# Patient Record
Sex: FEMALE | Race: BLACK OR AFRICAN AMERICAN | Employment: OTHER | URBAN - METROPOLITAN AREA
[De-identification: names, ages, dates, MRNs, and addresses within clinical notes are randomized per-mention and may not be internally consistent; named-entity substitution may affect disease eponyms.]

---

## 2023-08-14 ENCOUNTER — ANCILLARY PROCEDURE (OUTPATIENT)
Dept: EMERGENCY DEPT | Age: 78
End: 2023-08-14
Attending: EMERGENCY MEDICINE
Payer: MEDICARE

## 2023-08-14 ENCOUNTER — HOSPITAL ENCOUNTER (INPATIENT)
Age: 78
LOS: 3 days | Discharge: NURSING, REHAB OR LONG-TERM CARE | End: 2023-08-17
Attending: EMERGENCY MEDICINE | Admitting: INTERNAL MEDICINE
Payer: MEDICARE

## 2023-08-14 ENCOUNTER — APPOINTMENT (OUTPATIENT)
Dept: GENERAL RADIOLOGY | Age: 78
End: 2023-08-14
Payer: MEDICARE

## 2023-08-14 DIAGNOSIS — N18.6 ESRD (END STAGE RENAL DISEASE) (HCC): Primary | ICD-10-CM

## 2023-08-14 LAB
ANION GAP SERPL CALCULATED.3IONS-SCNC: 13 MMOL/L (ref 3–16)
ANION GAP SERPL CALCULATED.3IONS-SCNC: 15 MMOL/L (ref 3–16)
BASE EXCESS BLDV CALC-SCNC: 1.3 MMOL/L (ref -2–3)
BASOPHILS # BLD: 0.1 K/UL (ref 0–0.2)
BASOPHILS # BLD: 0.1 K/UL (ref 0–0.2)
BASOPHILS NFR BLD: 0.7 %
BASOPHILS NFR BLD: 0.8 %
BUN SERPL-MCNC: 36 MG/DL (ref 7–20)
BUN SERPL-MCNC: 39 MG/DL (ref 7–20)
CALCIUM SERPL-MCNC: 9.3 MG/DL (ref 8.3–10.6)
CALCIUM SERPL-MCNC: 9.5 MG/DL (ref 8.3–10.6)
CHLORIDE SERPL-SCNC: 96 MMOL/L (ref 99–110)
CHLORIDE SERPL-SCNC: 96 MMOL/L (ref 99–110)
CO2 BLDV-SCNC: 30 MMOL/L
CO2 SERPL-SCNC: 24 MMOL/L (ref 21–32)
CO2 SERPL-SCNC: 26 MMOL/L (ref 21–32)
COHGB MFR BLDV: 1.6 % (ref 0–1.5)
CREAT SERPL-MCNC: 5 MG/DL (ref 0.6–1.2)
CREAT SERPL-MCNC: 5.2 MG/DL (ref 0.6–1.2)
DEPRECATED RDW RBC AUTO: 15.7 % (ref 12.4–15.4)
DEPRECATED RDW RBC AUTO: 16.3 % (ref 12.4–15.4)
DO-HGB MFR BLDV: 15.4 %
EKG ATRIAL RATE: 76 BPM
EKG ATRIAL RATE: 77 BPM
EKG DIAGNOSIS: NORMAL
EKG DIAGNOSIS: NORMAL
EKG P AXIS: 26 DEGREES
EKG P AXIS: 39 DEGREES
EKG P-R INTERVAL: 188 MS
EKG P-R INTERVAL: 202 MS
EKG Q-T INTERVAL: 436 MS
EKG Q-T INTERVAL: 440 MS
EKG QRS DURATION: 132 MS
EKG QRS DURATION: 136 MS
EKG QTC CALCULATION (BAZETT): 493 MS
EKG QTC CALCULATION (BAZETT): 495 MS
EKG R AXIS: -15 DEGREES
EKG R AXIS: -31 DEGREES
EKG T AXIS: 43 DEGREES
EKG T AXIS: 81 DEGREES
EKG VENTRICULAR RATE: 76 BPM
EKG VENTRICULAR RATE: 77 BPM
EOSINOPHIL # BLD: 0.1 K/UL (ref 0–0.6)
EOSINOPHIL # BLD: 0.3 K/UL (ref 0–0.6)
EOSINOPHIL NFR BLD: 0.8 %
EOSINOPHIL NFR BLD: 3.3 %
FLUAV RNA UPPER RESP QL NAA+PROBE: NEGATIVE
FLUBV AG NPH QL: NEGATIVE
GFR SERPLBLD CREATININE-BSD FMLA CKD-EPI: 8 ML/MIN/{1.73_M2}
GFR SERPLBLD CREATININE-BSD FMLA CKD-EPI: 8 ML/MIN/{1.73_M2}
GLUCOSE BLD-MCNC: 140 MG/DL (ref 70–99)
GLUCOSE BLD-MCNC: 70 MG/DL (ref 70–99)
GLUCOSE BLD-MCNC: 78 MG/DL (ref 70–99)
GLUCOSE BLD-MCNC: 92 MG/DL (ref 70–99)
GLUCOSE SERPL-MCNC: 152 MG/DL (ref 70–99)
GLUCOSE SERPL-MCNC: 169 MG/DL (ref 70–99)
HCO3 BLDV-SCNC: 28 MMOL/L (ref 24–28)
HCT VFR BLD AUTO: 31.1 % (ref 36–48)
HCT VFR BLD AUTO: 32.7 % (ref 36–48)
HGB BLD-MCNC: 10.8 G/DL (ref 12–16)
HGB BLD-MCNC: 10.8 G/DL (ref 12–16)
INR PPP: 2.4 (ref 0.84–1.16)
LYMPHOCYTES # BLD: 0.6 K/UL (ref 1–5.1)
LYMPHOCYTES # BLD: 1.1 K/UL (ref 1–5.1)
LYMPHOCYTES NFR BLD: 11.8 %
LYMPHOCYTES NFR BLD: 9 %
MAGNESIUM SERPL-MCNC: 1.9 MG/DL (ref 1.8–2.4)
MCH RBC QN AUTO: 33.4 PG (ref 26–34)
MCH RBC QN AUTO: 34.3 PG (ref 26–34)
MCHC RBC AUTO-ENTMCNC: 33.1 G/DL (ref 31–36)
MCHC RBC AUTO-ENTMCNC: 34.6 G/DL (ref 31–36)
MCV RBC AUTO: 101.1 FL (ref 80–100)
MCV RBC AUTO: 99.3 FL (ref 80–100)
METHGB MFR BLDV: 0.2 % (ref 0–1.5)
MONOCYTES # BLD: 0.5 K/UL (ref 0–1.3)
MONOCYTES # BLD: 0.5 K/UL (ref 0–1.3)
MONOCYTES NFR BLD: 5.3 %
MONOCYTES NFR BLD: 7.1 %
NEUTROPHILS # BLD: 5.5 K/UL (ref 1.7–7.7)
NEUTROPHILS # BLD: 7.1 K/UL (ref 1.7–7.7)
NEUTROPHILS NFR BLD: 78.9 %
NEUTROPHILS NFR BLD: 82.3 %
NT-PROBNP SERPL-MCNC: ABNORMAL PG/ML (ref 0–449)
PCO2 BLDV: 53.3 MMHG (ref 41–51)
PERFORMED ON: ABNORMAL
PERFORMED ON: NORMAL
PH BLDV: 7.33 [PH] (ref 7.35–7.45)
PLATELET # BLD AUTO: 214 K/UL (ref 135–450)
PLATELET # BLD AUTO: 271 K/UL (ref 135–450)
PMV BLD AUTO: 10.5 FL (ref 5–10.5)
PMV BLD AUTO: 9.7 FL (ref 5–10.5)
PO2 BLDV: 51.6 MMHG (ref 25–40)
POTASSIUM SERPL-SCNC: 4.5 MMOL/L (ref 3.5–5.1)
POTASSIUM SERPL-SCNC: 5.4 MMOL/L (ref 3.5–5.1)
PROTHROMBIN TIME: 26 SEC (ref 11.5–14.8)
RBC # BLD AUTO: 3.14 M/UL (ref 4–5.2)
RBC # BLD AUTO: 3.23 M/UL (ref 4–5.2)
SAO2 % BLDV: 84 %
SARS-COV-2 RDRP RESP QL NAA+PROBE: NOT DETECTED
SODIUM SERPL-SCNC: 135 MMOL/L (ref 136–145)
SODIUM SERPL-SCNC: 135 MMOL/L (ref 136–145)
TROPONIN, HIGH SENSITIVITY: 145 NG/L (ref 0–14)
TROPONIN, HIGH SENSITIVITY: 149 NG/L (ref 0–14)
WBC # BLD AUTO: 6.7 K/UL (ref 4–11)
WBC # BLD AUTO: 9 K/UL (ref 4–11)

## 2023-08-14 PROCEDURE — 87804 INFLUENZA ASSAY W/OPTIC: CPT

## 2023-08-14 PROCEDURE — 87635 SARS-COV-2 COVID-19 AMP PRB: CPT

## 2023-08-14 PROCEDURE — 36415 COLL VENOUS BLD VENIPUNCTURE: CPT

## 2023-08-14 PROCEDURE — 2580000003 HC RX 258

## 2023-08-14 PROCEDURE — 94761 N-INVAS EAR/PLS OXIMETRY MLT: CPT

## 2023-08-14 PROCEDURE — 99285 EMERGENCY DEPT VISIT HI MDM: CPT

## 2023-08-14 PROCEDURE — 5A1D70Z PERFORMANCE OF URINARY FILTRATION, INTERMITTENT, LESS THAN 6 HOURS PER DAY: ICD-10-PCS | Performed by: INTERNAL MEDICINE

## 2023-08-14 PROCEDURE — 80048 BASIC METABOLIC PNL TOTAL CA: CPT

## 2023-08-14 PROCEDURE — 90935 HEMODIALYSIS ONE EVALUATION: CPT

## 2023-08-14 PROCEDURE — 2700000000 HC OXYGEN THERAPY PER DAY

## 2023-08-14 PROCEDURE — 83735 ASSAY OF MAGNESIUM: CPT

## 2023-08-14 PROCEDURE — 71046 X-RAY EXAM CHEST 2 VIEWS: CPT

## 2023-08-14 PROCEDURE — 85025 COMPLETE CBC W/AUTO DIFF WBC: CPT

## 2023-08-14 PROCEDURE — 80074 ACUTE HEPATITIS PANEL: CPT

## 2023-08-14 PROCEDURE — 94640 AIRWAY INHALATION TREATMENT: CPT

## 2023-08-14 PROCEDURE — 76604 US EXAM CHEST: CPT

## 2023-08-14 PROCEDURE — 82803 BLOOD GASES ANY COMBINATION: CPT

## 2023-08-14 PROCEDURE — 93005 ELECTROCARDIOGRAM TRACING: CPT

## 2023-08-14 PROCEDURE — 85610 PROTHROMBIN TIME: CPT

## 2023-08-14 PROCEDURE — 84484 ASSAY OF TROPONIN QUANT: CPT

## 2023-08-14 PROCEDURE — 6370000000 HC RX 637 (ALT 250 FOR IP)

## 2023-08-14 PROCEDURE — 1200000000 HC SEMI PRIVATE

## 2023-08-14 PROCEDURE — 83880 ASSAY OF NATRIURETIC PEPTIDE: CPT

## 2023-08-14 RX ORDER — ACETAMINOPHEN 325 MG/1
650 TABLET ORAL EVERY 6 HOURS PRN
Status: DISCONTINUED | OUTPATIENT
Start: 2023-08-14 | End: 2023-08-17 | Stop reason: HOSPADM

## 2023-08-14 RX ORDER — ACETAMINOPHEN 650 MG/1
650 SUPPOSITORY RECTAL EVERY 6 HOURS PRN
Status: DISCONTINUED | OUTPATIENT
Start: 2023-08-14 | End: 2023-08-17 | Stop reason: HOSPADM

## 2023-08-14 RX ORDER — METOPROLOL SUCCINATE 50 MG/1
50 TABLET, EXTENDED RELEASE ORAL DAILY
Status: ON HOLD | COMMUNITY
End: 2023-08-17 | Stop reason: HOSPADM

## 2023-08-14 RX ORDER — SODIUM CHLORIDE 9 MG/ML
INJECTION, SOLUTION INTRAVENOUS PRN
Status: DISCONTINUED | OUTPATIENT
Start: 2023-08-14 | End: 2023-08-17 | Stop reason: HOSPADM

## 2023-08-14 RX ORDER — INSULIN LISPRO 100 [IU]/ML
0-4 INJECTION, SOLUTION INTRAVENOUS; SUBCUTANEOUS NIGHTLY
Status: DISCONTINUED | OUTPATIENT
Start: 2023-08-14 | End: 2023-08-15

## 2023-08-14 RX ORDER — SODIUM CHLORIDE 0.9 % (FLUSH) 0.9 %
5-40 SYRINGE (ML) INJECTION EVERY 12 HOURS SCHEDULED
Status: DISCONTINUED | OUTPATIENT
Start: 2023-08-14 | End: 2023-08-17 | Stop reason: HOSPADM

## 2023-08-14 RX ORDER — DEXTRAN 70, GLYCERIN, HYPROMELLOSE 1; 2; 3 MG/ML; MG/ML; MG/ML
1 SOLUTION/ DROPS OPHTHALMIC 2 TIMES DAILY
Status: DISCONTINUED | OUTPATIENT
Start: 2023-08-14 | End: 2023-08-17 | Stop reason: HOSPADM

## 2023-08-14 RX ORDER — METOPROLOL SUCCINATE 25 MG/1
50 TABLET, EXTENDED RELEASE ORAL
Status: DISCONTINUED | OUTPATIENT
Start: 2023-08-14 | End: 2023-08-16

## 2023-08-14 RX ORDER — AMLODIPINE BESYLATE 10 MG/1
10 TABLET ORAL
COMMUNITY

## 2023-08-14 RX ORDER — PANTOPRAZOLE SODIUM 40 MG/1
40 TABLET, DELAYED RELEASE ORAL 2 TIMES DAILY
COMMUNITY

## 2023-08-14 RX ORDER — ONDANSETRON 4 MG/1
4 TABLET, ORALLY DISINTEGRATING ORAL EVERY 8 HOURS PRN
Status: DISCONTINUED | OUTPATIENT
Start: 2023-08-14 | End: 2023-08-17 | Stop reason: HOSPADM

## 2023-08-14 RX ORDER — MIRTAZAPINE 15 MG/1
7.5 TABLET, FILM COATED ORAL NIGHTLY
Status: DISCONTINUED | OUTPATIENT
Start: 2023-08-14 | End: 2023-08-17 | Stop reason: HOSPADM

## 2023-08-14 RX ORDER — ACETAMINOPHEN 325 MG/1
650 TABLET ORAL EVERY 6 HOURS PRN
COMMUNITY

## 2023-08-14 RX ORDER — GLYCERIN .002; .002; .01 MG/MG; MG/MG; MG/MG
1 SOLUTION/ DROPS OPHTHALMIC 2 TIMES DAILY
COMMUNITY

## 2023-08-14 RX ORDER — SODIUM CHLORIDE 0.9 % (FLUSH) 0.9 %
5-40 SYRINGE (ML) INJECTION PRN
Status: DISCONTINUED | OUTPATIENT
Start: 2023-08-14 | End: 2023-08-17 | Stop reason: HOSPADM

## 2023-08-14 RX ORDER — DEXTROSE MONOHYDRATE 100 MG/ML
INJECTION, SOLUTION INTRAVENOUS CONTINUOUS PRN
Status: DISCONTINUED | OUTPATIENT
Start: 2023-08-14 | End: 2023-08-17 | Stop reason: HOSPADM

## 2023-08-14 RX ORDER — POLYETHYLENE GLYCOL 3350 17 G/17G
17 POWDER, FOR SOLUTION ORAL DAILY
COMMUNITY

## 2023-08-14 RX ORDER — MIRTAZAPINE 15 MG/1
7.5 TABLET, FILM COATED ORAL NIGHTLY
COMMUNITY

## 2023-08-14 RX ORDER — ONDANSETRON 2 MG/ML
4 INJECTION INTRAMUSCULAR; INTRAVENOUS EVERY 6 HOURS PRN
Status: DISCONTINUED | OUTPATIENT
Start: 2023-08-14 | End: 2023-08-17 | Stop reason: HOSPADM

## 2023-08-14 RX ORDER — WARFARIN SODIUM 2.5 MG/1
2.5 TABLET ORAL
Status: COMPLETED | OUTPATIENT
Start: 2023-08-14 | End: 2023-08-14

## 2023-08-14 RX ORDER — ATORVASTATIN CALCIUM 40 MG/1
40 TABLET, FILM COATED ORAL DAILY
Status: DISCONTINUED | OUTPATIENT
Start: 2023-08-14 | End: 2023-08-17 | Stop reason: HOSPADM

## 2023-08-14 RX ORDER — LIDOCAINE 50 MG/G
1 PATCH TOPICAL DAILY
COMMUNITY

## 2023-08-14 RX ORDER — PANTOPRAZOLE SODIUM 40 MG/1
40 TABLET, DELAYED RELEASE ORAL 2 TIMES DAILY
Status: DISCONTINUED | OUTPATIENT
Start: 2023-08-14 | End: 2023-08-17 | Stop reason: HOSPADM

## 2023-08-14 RX ORDER — NICOTINE POLACRILEX 4 MG
15 LOZENGE BUCCAL PRN
COMMUNITY

## 2023-08-14 RX ORDER — INSULIN LISPRO 100 [IU]/ML
0-4 INJECTION, SOLUTION INTRAVENOUS; SUBCUTANEOUS
Status: DISCONTINUED | OUTPATIENT
Start: 2023-08-14 | End: 2023-08-15

## 2023-08-14 RX ORDER — WARFARIN SODIUM 2.5 MG/1
2.5 TABLET ORAL DAILY
COMMUNITY

## 2023-08-14 RX ORDER — ATORVASTATIN CALCIUM 40 MG/1
40 TABLET, FILM COATED ORAL DAILY
COMMUNITY

## 2023-08-14 RX ORDER — IPRATROPIUM BROMIDE AND ALBUTEROL SULFATE 2.5; .5 MG/3ML; MG/3ML
1 SOLUTION RESPIRATORY (INHALATION) ONCE
Status: COMPLETED | OUTPATIENT
Start: 2023-08-14 | End: 2023-08-14

## 2023-08-14 RX ORDER — POLYETHYLENE GLYCOL 3350 17 G/17G
17 POWDER, FOR SOLUTION ORAL DAILY PRN
Status: DISCONTINUED | OUTPATIENT
Start: 2023-08-14 | End: 2023-08-17 | Stop reason: HOSPADM

## 2023-08-14 RX ORDER — AMLODIPINE BESYLATE 10 MG/1
10 TABLET ORAL
Status: DISCONTINUED | OUTPATIENT
Start: 2023-08-14 | End: 2023-08-17 | Stop reason: HOSPADM

## 2023-08-14 RX ADMIN — PANTOPRAZOLE SODIUM 40 MG: 40 TABLET, DELAYED RELEASE ORAL at 21:03

## 2023-08-14 RX ADMIN — AMLODIPINE BESYLATE 10 MG: 10 TABLET ORAL at 06:13

## 2023-08-14 RX ADMIN — PANTOPRAZOLE SODIUM 40 MG: 40 TABLET, DELAYED RELEASE ORAL at 06:13

## 2023-08-14 RX ADMIN — PANCRELIPASE 6000 UNITS: 30000; 6000; 19000 CAPSULE, DELAYED RELEASE PELLETS ORAL at 15:37

## 2023-08-14 RX ADMIN — METOPROLOL SUCCINATE 50 MG: 25 TABLET, EXTENDED RELEASE ORAL at 21:03

## 2023-08-14 RX ADMIN — PANCRELIPASE 6000 UNITS: 30000; 6000; 19000 CAPSULE, DELAYED RELEASE PELLETS ORAL at 21:03

## 2023-08-14 RX ADMIN — SODIUM CHLORIDE, PRESERVATIVE FREE 10 ML: 5 INJECTION INTRAVENOUS at 21:04

## 2023-08-14 RX ADMIN — DEXTRAN 70, GLYCERIN, HYPROMELLOSE 1 DROP: 1; 2; 3 SOLUTION/ DROPS OPHTHALMIC at 15:47

## 2023-08-14 RX ADMIN — IPRATROPIUM BROMIDE AND ALBUTEROL SULFATE 1 DOSE: .5; 3 SOLUTION RESPIRATORY (INHALATION) at 02:23

## 2023-08-14 RX ADMIN — Medication 5000 UNITS: at 15:35

## 2023-08-14 RX ADMIN — SODIUM CHLORIDE, PRESERVATIVE FREE 10 ML: 5 INJECTION INTRAVENOUS at 10:22

## 2023-08-14 RX ADMIN — WARFARIN SODIUM 2.5 MG: 2.5 TABLET ORAL at 18:06

## 2023-08-14 RX ADMIN — METOPROLOL SUCCINATE 50 MG: 25 TABLET, EXTENDED RELEASE ORAL at 06:12

## 2023-08-14 RX ADMIN — MIRTAZAPINE 7.5 MG: 15 TABLET, FILM COATED ORAL at 21:03

## 2023-08-14 RX ADMIN — ATORVASTATIN CALCIUM 40 MG: 40 TABLET, FILM COATED ORAL at 15:35

## 2023-08-14 RX ADMIN — AMLODIPINE BESYLATE 10 MG: 10 TABLET ORAL at 18:06

## 2023-08-14 ASSESSMENT — PAIN SCALES - GENERAL
PAINLEVEL_OUTOF10: 0

## 2023-08-14 ASSESSMENT — ENCOUNTER SYMPTOMS
ABDOMINAL PAIN: 0
COUGH: 0
VOMITING: 0
SHORTNESS OF BREATH: 1
NAUSEA: 0
BACK PAIN: 0

## 2023-08-14 NOTE — DISCHARGE INSTRUCTIONS
Extra Heart Failure sites:     https://[employer]. com/publication/?c=558749   --- this is American Heart Association interactive Healthier Living with Heart Failure guidebook. Please click hyperlink or copy / paste link into search bar. Use your mouse to scroll through the pages. Lots of information about weight monitoring, diet tips, activity, meds, etc    HF [place] tere  -- this is a free smart phone tere available for iPhone and Android download. Use your phone to track sodium / fluid intake, zone tool symptom tracking, weights, medications, etc. Click on this hyperlink  HF [place]   for QR code for easy download. DASH (Dietary Approach to Stop Hypertension) diet --  SeekAlumni.no -- this diet is a flexible eating plan that promotes heart healthy eating style. Click on hyperlink or copy / paste link into search bar. Lots of low sodium recipes and tips.     [employer].ca  -- more free recipes

## 2023-08-14 NOTE — PLAN OF CARE
Problem: Discharge Planning  Goal: Discharge to home or other facility with appropriate resources  8/14/2023 1200 by Michelle Foreman RN  Outcome: Progressing  Flowsheets (Taken 8/14/2023 1200)  Discharge to home or other facility with appropriate resources:   Identify barriers to discharge with patient and caregiver   Arrange for needed discharge resources and transportation as appropriate   Identify discharge learning needs (meds, wound care, etc)   Refer to discharge planning if patient needs post-hospital services based on physician order or complex needs related to functional status, cognitive ability or social support system     Problem: Skin/Tissue Integrity  Goal: Absence of new skin breakdown  Description: 1. Monitor for areas of redness and/or skin breakdown  2. Assess vascular access sites hourly  3. Every 4-6 hours minimum:  Change oxygen saturation probe site  4. Every 4-6 hours:  If on nasal continuous positive airway pressure, respiratory therapy assess nares and determine need for appliance change or resting period.   8/14/2023 0546 by Kavon Brunson RN  Outcome: Progressing  Note: Q2 turn     Problem: Safety - Adult  Goal: Free from fall injury  8/14/2023 1200 by Michelle Foreman RN  Outcome: Progressing  Flowsheets (Taken 8/14/2023 1200)  Free From Fall Injury:   Instruct family/caregiver on patient safety   Based on caregiver fall risk screen, instruct family/caregiver to ask for assistance with transferring infant if caregiver noted to have fall risk factors     Problem: Pain  Goal: Verbalizes/displays adequate comfort level or baseline comfort level  Outcome: Progressing  Flowsheets (Taken 8/14/2023 1200)  Verbalizes/displays adequate comfort level or baseline comfort level:   Encourage patient to monitor pain and request assistance   Assess pain using appropriate pain scale   Administer analgesics based on type and severity of pain and evaluate response   Implement

## 2023-08-14 NOTE — CONSULTS
Nephrology Consult Note          [facility]      [email or web address]         Phone: [phone]      8/14/2023 11:20 AM     Patient: Mariam Galan [id number]  [id number]  Date of Admit: 8/14/2023 LOS: 0 days  Referring physician:    Plan: We will arrange dialysis for today. Resumed her home schedule, MWF. Patient states she makes some urine, will monitor UOP closely. Obtain accurate bed weight if possible. Monitor vitals, labs closely. Renally dose of medications, avoid nephrotoxins. Thank you for allowing us to participate in this patient's care    In case of any question please call us at our 24 hour answering service [phone] or from 7 AM to 5 PM via [employer] or cell number    Mike Cortez MD  [facility]  [facility], [facility] [address]  Fax: [phone]  Office: [phone]     Assessment & Plan     Renal function:  ESRD on HD    Baseline Cr around 5. Will arrange for dialysis per her regular schedule, next session will be today. Electrolytes:  No abnormalities. Lab Results   Component Value Date    CREATININE 5.2 (HH) 08/14/2023    BUN 39 (H) 08/14/2023     (L) 08/14/2023    K 4.5 08/14/2023    CL 96 (L) 08/14/2023    CO2 26 08/14/2023      Lab Results   Component Value Date    CALCIUM 9.5 08/14/2023     Acid/Base status:  Stable. Volume status/BP:  Diminished breath sounds. CXR with evidence of fluid overload, b/l pleural effusions. Hematology:   No results found for: IRON, TIBC, FERRITIN  Lab Results   Component Value Date    WBC 6.7 08/14/2023    HGB 10.8 (L) 08/14/2023    HCT 32.7 (L) 08/14/2023    .1 (H) 08/14/2023     08/14/2023     Reason for Consult     Reason for consult: SOB, vol overload, need for dialysis. Chief complaint:   Chief Complaint   Patient presents with    Shortness of Breath     Pt arrives via EMS for c/o SOB and CP. PT is a dialysis patient reports no missed sessions.  Does not normally

## 2023-08-14 NOTE — PLAN OF CARE
[facility] Hospitalist brief note  Consult received. Case reviewed with ER physician  78-year-old female on hemodialysis comes in with fluid overload. Full note to follow.     Tiffanie Zepeda MD    Thanks  Frantz Duff MD

## 2023-08-14 NOTE — H&P
Internal Medicine  PGY 1  History & Physical      CC :SOB    History Obtained From:  patient    HISTORY OF PRESENT ILLNESS:    Abel Birch is a 78 y.o. female with a PMHx of ESRD w/ MWF HD, CHF, T2DM on insulin s/p BLE amputation, PE, Stroke, HTN, CAD, and  who presented from nursing home with SOB. Patient is a poor historian. It appears that patient is not on home oxygen but required up to 5L in the ED. Patient states that she hasn't missed any sessions and makes urine. On arrival to the ED, patient was hypertensive, hypoxic and had increased work of breathing. Required up to 5L O2 to saturate well. [place] [address] and Troponin of 145, repeat of 149. Bedside ultrasound showed B-lines and chest x-ray showed bibasilar hazy attenuation favoring congestive heart failure with bilateral pleural effusions. Admitted for urgent dialysis. Denies CP, abdominal pain, headache, cough, N/V but does endorse right knee pain. States that she has chronic constipation and diarrhea. Past Medical History:    No past medical history on file. Past Surgical History:    No past surgical history on file. Medications Priorto Admission:    Not in a hospital admission. Allergies:  Patient has no known allergies. Social History:   TOBACCO:   has no history on file for tobacco use. ETOH:   has no history on file for alcohol use. DRUGS :   Patient currently lives in a nursing home [facility]    Family History:   No family history on file. Review of Systems    ROS: A 10 point review of systems was conducted, significant findings as noted in HPI. Physical Exam  Constitutional:       General: She is not in acute distress. Appearance: She is not toxic-appearing. HENT:      Head: Normocephalic and atraumatic. Neck:      Comments: JVD present   Cardiovascular:      Rate and Rhythm: Normal rate and regular rhythm. Pulses: Normal pulses. Heart sounds: Normal heart sounds.    Pulmonary:

## 2023-08-14 NOTE — ED NOTES
ED TO INPATIENT SBAR HANDOFF    Patient Name: Charles Aguirre   :  1945  78 y.o. MRN:  [id number]  Preferred Name  [address] [place] [address]  ED Room #:  2TR/2TRA-A2  Family/Caregiver Present yes   Restraints no   Sitter no   Sepsis Risk Score Sepsis Risk Score: 1.1    Situation  Code Status: No Order No additional code details. Allergies: Patient has no known allergies. Weight: Patient Vitals for the past 96 hrs (Last 3 readings):   Weight   23 0136 113 lb 11.2 oz (51.6 kg)     Arrived from: nursing home  Chief Complaint:   Chief Complaint   Patient presents with    Shortness of Breath     Pt arrives via EMS for c/o SOB and CP. PT is a dialysis patient reports no missed sessions. Does not normally wear oxygen, requiring O2 3LPM per NC to maintain sats >90% at this time. Hospital Problem/Diagnosis:  Principal Problem:    ESRD (end stage renal disease) ([address])  Resolved Problems:    * No resolved hospital problems. *    Imaging:   POC US CHEST INCLUDING MEDIASTINUM   Final Result      XR CHEST (2 VW)   Final Result      Cardiac enlargement, perihilar and bibasilar hazy attenuation visualized favoring congestive heart failure. Bilateral pleural effusions are also noted.       Electronically signed by MD Briana Jose        Abnormal labs:   Abnormal Labs Reviewed   CBC WITH AUTO DIFFERENTIAL - Abnormal; Notable for the following components:       Result Value    RBC 3.14 (*)     Hemoglobin 10.8 (*)     Hematocrit 31.1 (*)     MCH 34.3 (*)     RDW 16.3 (*)     All other components within normal limits   BASIC METABOLIC PANEL W/ REFLEX TO MG FOR LOW K - Abnormal; Notable for the following components:    Sodium 135 (*)     Potassium reflex Magnesium 5.4 (*)     Chloride 96 (*)     Glucose 169 (*)     BUN 36 (*)     Creatinine 5.0 (*)     Est, Glom Filt Rate 8 (*)     All other components within normal limits   TROPONIN - Abnormal; Notable for the following components:    Troponin, High Sensitivity 145 (*)     All other

## 2023-08-14 NOTE — PROGRESS NOTES
4 Eyes Skin Assessment     NAME:  Mazin Harry  YOB: 1945  MEDICAL RECORD NUMBER:  [id number]    The patient is being assessed for  Admission    I agree that at least one RN has performed a thorough Head to Toe Skin Assessment on the patient. ALL assessment sites listed below have been assessed. Areas assessed by both nurses:    Head, Face, Ears, Shoulders, Back, Chest, Arms, Elbows, Hands, Sacrum. Buttock, Coccyx, Ischium, Legs. Feet and Heels, and Under Medical Devices           Patient has a stage two on top of coccyx and left buttocks. Does the Patient have a Wound? Yes wound(s) were present on assessment.  LDA wound assessment was Initiated and completed by RN       Ishmael Prevention initiated by RN: Yes  Wound Care Orders initiated by RN: Yes    Pressure Injury (Stage 3,4, Unstageable, DTI, NWPT, and Complex wounds) if present, place Wound referral order by RN under : No    New Ostomies, if present place, Ostomy referral order under : No     Nurse 1 eSignature: Electronically signed by Vera Blevins RN on 8/14/23 at 5:47 AM EDT    **SHARE this note so that the co-signing nurse can place an eSignature**    Nurse 2 eSignature: Electronically signed by Peyton Wilkins RN on 2/37/21 at 5:30 AM EDT

## 2023-08-14 NOTE — PLAN OF CARE
Problem: Discharge Planning  Goal: Discharge to home or other facility with appropriate resources  Outcome: Progressing  Flowsheets (Taken 8/14/2023 0546)  Discharge to home or other facility with appropriate resources:   Identify barriers to discharge with patient and caregiver   Arrange for needed discharge resources and transportation as appropriate   Identify discharge learning needs (meds, wound care, etc)     Problem: Skin/Tissue Integrity  Goal: Absence of new skin breakdown  Description: 1. Monitor for areas of redness and/or skin breakdown  2. Assess vascular access sites hourly  3. Every 4-6 hours minimum:  Change oxygen saturation probe site  4. Every 4-6 hours:  If on nasal continuous positive airway pressure, respiratory therapy assess nares and determine need for appliance change or resting period. Outcome: Progressing  Note: Q2 turn     Problem: Safety - Adult  Goal: Free from fall injury  Outcome: Progressing  Flowsheets (Taken 8/14/2023 0546)  Free From Fall Injury: Instruct family/caregiver on patient safety  Note: Pt is a Fall Risk. See Osorio Jimenes Fall Risk Score. Pt bed in low position and side rails up. Call light and belongings in reach. Pt encouraged to call for assistance. Will continue with hourly rounds for PO intake, pain needs, toileting, and repositioning as needed.

## 2023-08-14 NOTE — CONSULTS
Clinical Pharmacy Consult Note  Medication History     Admit Date: 8/14/2023    Pharmacy consulted to verify home medication list by Dr. Arben Diaz.    List of of current medications patient is taking is complete. Home Medication list in EPIC updated to reflect changes noted below. Source of information: [address] made to medication list:   Medications removed: (include reason, ex: therapy completed, patient no longer taking, etc.)  None   Medications added:   Glucose gel  Glucose tablet  Dimethicone cream  Medication doses adjusted:   Vitamin d3 5,000 units daily changed to 50,000 units daily  Other notes:   50,000 units of vitamin d3 is a very high dose, it's usually given once weekly or monthly  I can see on the pt's nursing facility [facility] that's she's getting this daily     Current Outpatient Medications   Medication Instructions    acetaminophen (TYLENOL) 650 mg, Oral, EVERY 6 HOURS PRN    amLODIPine (NORVASC) 10 mg, Oral, DAILY WITH DINNER    atorvastatin (LIPITOR) 40 mg, Oral, DAILY    Cholecalciferol 50,000 Units, Oral, DAILY    DIMETHICONE, TOPICAL, 2 % CREA Apply externally, Apply to buttocks/ ailin area each incontinence episode every shift     glucose (GLUTOSE) 15 g, Oral, PRN    glycerin-hypromellose- (ARTIFICIAL TEARS) 0.2-0.2-1 % SOLN opthalmic solution 1 drop, Both Eyes, 2 TIMES DAILY    lidocaine (LIDODERM) 5 % 1 patch, TransDERmal, DAILY, 12 hours on, 12 hours off.  Apply 1 patch to left shoulder at dinner and remove in the morning     metoprolol succinate (TOPROL XL) 50 mg, Oral, DAILY    mirtazapine (REMERON) 7.5 mg, Oral, NIGHTLY    Multiple Vitamin (MULTIVITAMIN ADULT PO) 1 tablet, Oral, DAILY    Nutritional Supplements (GLUCOSE MANAGEMENT) TABS 1 tablet, Oral, PRN, Take 3 tablets po every 15 minutes prn for fasting blood glucose <70     Pancrelipase, Lip-Prot-Amyl, (ZENPEP PO) 5,000 Units, Oral, 3 times daily, 5,000 unit-17,000 unit-24,000 unit

## 2023-08-14 NOTE — DIALYSIS
Treatment time: 3 hours  Net UF: 2500 ml     Pre weight: 50.8 kg  Post weight:48.3 kg    Access used: R AVF    Access function: well with  ml/min     Medications or blood products given: none      Regular outpatient schedule: MWF     Summary of response to treatment: Patient tolerated treatment well and without any complications. Patient remained stable throughout entire treatment and upon exiting the dialysis suite via transport. Report given to Noah Valentine RN and copy of dialysis treatment record placed in chart, to be scanned into EMR.

## 2023-08-14 NOTE — ED PROVIDER NOTES
ED Attending Attestation Note     Date of evaluation: 8/14/2023    This patient was seen by the resident. I have seen and examined the patient, agree with the workup, evaluation, management and diagnosis. The care plan has been discussed. I have reviewed the ECG and concur with the resident's interpretation. I was present for any procedures performed in the resident's  note and have made edits to the note where appropriate. My assessment reveals 78 y.o. female with history of ESRD on hemodialysis, CHF, diabetes, CAD, multiple other comorbidities presenting to the emergency department today for shortness of breath. On arrival she is in no distress but does have a moderately increased work of breathing and is requiring nasal cannula oxygen. She does not appear frankly volume overloaded external he but auscultation reveals bibasilar crackles and an occasional wheeze. Unclear if she has any pulmonary history, and did receive nebulizer treatments without significant improvement. She is moderately hypertensive but not severely so. She appears to have B-lines diffusely on bedside ultrasound and also some edema on her chest x-ray. She does not require BiPAP or nitroglycerin at this time but given that we cannot diurese her will arrange for admission for relatively urgent dialysis. Critical Care:  Due to the immediate potential for life-threatening deterioration due to hypoxic respiratory failure and need for unscheduled hemodialysis, I spent 31 minutes providing critical care. This time excludes time spent performing procedures but includes time spent on direct patient care, history retrieval, review of the chart, and discussions with patient, family, and consultant(s).        Lex Guo MD  08/14/23 6744

## 2023-08-14 NOTE — ED TRIAGE NOTES
Pt arrives via EMS for c/o SOB and CP. PT is a dialysis patient reports no missed sessions. Does not normally wear oxygen, requiring O2 3LPM per NC to maintain sats >90% at this time.

## 2023-08-15 LAB
ALBUMIN SERPL-MCNC: 3.8 G/DL (ref 3.4–5)
ALP SERPL-CCNC: 91 U/L (ref 40–129)
ALT SERPL-CCNC: 7 U/L (ref 10–40)
ANION GAP SERPL CALCULATED.3IONS-SCNC: 11 MMOL/L (ref 3–16)
AST SERPL-CCNC: 21 U/L (ref 15–37)
BASOPHILS # BLD: 0.1 K/UL (ref 0–0.2)
BASOPHILS NFR BLD: 1.3 %
BILIRUB DIRECT SERPL-MCNC: <0.2 MG/DL (ref 0–0.3)
BILIRUB INDIRECT SERPL-MCNC: ABNORMAL MG/DL (ref 0–1)
BILIRUB SERPL-MCNC: 0.3 MG/DL (ref 0–1)
BUN SERPL-MCNC: 19 MG/DL (ref 7–20)
CALCIUM SERPL-MCNC: 9 MG/DL (ref 8.3–10.6)
CHLORIDE SERPL-SCNC: 96 MMOL/L (ref 99–110)
CO2 SERPL-SCNC: 30 MMOL/L (ref 21–32)
CREAT SERPL-MCNC: 3.6 MG/DL (ref 0.6–1.2)
DEPRECATED RDW RBC AUTO: 15.9 % (ref 12.4–15.4)
EOSINOPHIL # BLD: 0.6 K/UL (ref 0–0.6)
EOSINOPHIL NFR BLD: 10.6 %
GFR SERPLBLD CREATININE-BSD FMLA CKD-EPI: 12 ML/MIN/{1.73_M2}
GLUCOSE BLD-MCNC: 101 MG/DL (ref 70–99)
GLUCOSE BLD-MCNC: 110 MG/DL (ref 70–99)
GLUCOSE BLD-MCNC: 69 MG/DL (ref 70–99)
GLUCOSE BLD-MCNC: 77 MG/DL (ref 70–99)
GLUCOSE BLD-MCNC: 80 MG/DL (ref 70–99)
GLUCOSE BLD-MCNC: 89 MG/DL (ref 70–99)
GLUCOSE SERPL-MCNC: 75 MG/DL (ref 70–99)
HCT VFR BLD AUTO: 31.8 % (ref 36–48)
HGB BLD-MCNC: 10.5 G/DL (ref 12–16)
INR PPP: 2.74 (ref 0.84–1.16)
LV EF: 48 %
LVEF MODALITY: NORMAL
LYMPHOCYTES # BLD: 1.3 K/UL (ref 1–5.1)
LYMPHOCYTES NFR BLD: 23.3 %
MAGNESIUM SERPL-MCNC: 1.8 MG/DL (ref 1.8–2.4)
MCH RBC QN AUTO: 33.5 PG (ref 26–34)
MCHC RBC AUTO-ENTMCNC: 32.9 G/DL (ref 31–36)
MCV RBC AUTO: 101.7 FL (ref 80–100)
MONOCYTES # BLD: 0.7 K/UL (ref 0–1.3)
MONOCYTES NFR BLD: 13 %
NEUTROPHILS # BLD: 2.9 K/UL (ref 1.7–7.7)
NEUTROPHILS NFR BLD: 51.8 %
PERFORMED ON: ABNORMAL
PERFORMED ON: NORMAL
PLATELET # BLD AUTO: 219 K/UL (ref 135–450)
PMV BLD AUTO: 9.3 FL (ref 5–10.5)
POTASSIUM SERPL-SCNC: 3.8 MMOL/L (ref 3.5–5.1)
PROT SERPL-MCNC: 7.5 G/DL (ref 6.4–8.2)
PROTHROMBIN TIME: 28.9 SEC (ref 11.5–14.8)
RBC # BLD AUTO: 3.12 M/UL (ref 4–5.2)
SODIUM SERPL-SCNC: 137 MMOL/L (ref 136–145)
WBC # BLD AUTO: 5.6 K/UL (ref 4–11)

## 2023-08-15 PROCEDURE — 1200000000 HC SEMI PRIVATE

## 2023-08-15 PROCEDURE — 36415 COLL VENOUS BLD VENIPUNCTURE: CPT

## 2023-08-15 PROCEDURE — 6370000000 HC RX 637 (ALT 250 FOR IP): Performed by: INTERNAL MEDICINE

## 2023-08-15 PROCEDURE — 2580000003 HC RX 258

## 2023-08-15 PROCEDURE — 82746 ASSAY OF FOLIC ACID SERUM: CPT

## 2023-08-15 PROCEDURE — 90935 HEMODIALYSIS ONE EVALUATION: CPT

## 2023-08-15 PROCEDURE — 2700000000 HC OXYGEN THERAPY PER DAY

## 2023-08-15 PROCEDURE — 6370000000 HC RX 637 (ALT 250 FOR IP)

## 2023-08-15 PROCEDURE — 80048 BASIC METABOLIC PNL TOTAL CA: CPT

## 2023-08-15 PROCEDURE — 82607 VITAMIN B-12: CPT

## 2023-08-15 PROCEDURE — 85610 PROTHROMBIN TIME: CPT

## 2023-08-15 PROCEDURE — 85025 COMPLETE CBC W/AUTO DIFF WBC: CPT

## 2023-08-15 PROCEDURE — 94761 N-INVAS EAR/PLS OXIMETRY MLT: CPT

## 2023-08-15 PROCEDURE — 83735 ASSAY OF MAGNESIUM: CPT

## 2023-08-15 PROCEDURE — 93306 TTE W/DOPPLER COMPLETE: CPT

## 2023-08-15 PROCEDURE — 80076 HEPATIC FUNCTION PANEL: CPT

## 2023-08-15 RX ORDER — ASPIRIN 81 MG/1
81 TABLET, CHEWABLE ORAL DAILY
Status: DISCONTINUED | OUTPATIENT
Start: 2023-08-15 | End: 2023-08-17 | Stop reason: HOSPADM

## 2023-08-15 RX ORDER — WARFARIN SODIUM 2.5 MG/1
2.5 TABLET ORAL DAILY
Status: DISCONTINUED | OUTPATIENT
Start: 2023-08-15 | End: 2023-08-17

## 2023-08-15 RX ORDER — LOSARTAN POTASSIUM 25 MG/1
25 TABLET ORAL DAILY
Status: DISCONTINUED | OUTPATIENT
Start: 2023-08-15 | End: 2023-08-16

## 2023-08-15 RX ADMIN — DEXTRAN 70, GLYCERIN, HYPROMELLOSE 1 DROP: 1; 2; 3 SOLUTION/ DROPS OPHTHALMIC at 21:08

## 2023-08-15 RX ADMIN — SODIUM CHLORIDE, PRESERVATIVE FREE 10 ML: 5 INJECTION INTRAVENOUS at 09:11

## 2023-08-15 RX ADMIN — PANCRELIPASE 6000 UNITS: 30000; 6000; 19000 CAPSULE, DELAYED RELEASE PELLETS ORAL at 21:08

## 2023-08-15 RX ADMIN — PANCRELIPASE 6000 UNITS: 30000; 6000; 19000 CAPSULE, DELAYED RELEASE PELLETS ORAL at 09:10

## 2023-08-15 RX ADMIN — PANTOPRAZOLE SODIUM 40 MG: 40 TABLET, DELAYED RELEASE ORAL at 21:08

## 2023-08-15 RX ADMIN — WARFARIN SODIUM 2.5 MG: 2.5 TABLET ORAL at 17:00

## 2023-08-15 RX ADMIN — Medication 5000 UNITS: at 09:10

## 2023-08-15 RX ADMIN — ATORVASTATIN CALCIUM 40 MG: 40 TABLET, FILM COATED ORAL at 09:10

## 2023-08-15 RX ADMIN — Medication 16 G: at 08:51

## 2023-08-15 RX ADMIN — MIRTAZAPINE 7.5 MG: 15 TABLET, FILM COATED ORAL at 21:08

## 2023-08-15 RX ADMIN — AMLODIPINE BESYLATE 10 MG: 10 TABLET ORAL at 16:55

## 2023-08-15 RX ADMIN — ACETAMINOPHEN 650 MG: 325 TABLET ORAL at 13:46

## 2023-08-15 RX ADMIN — LOSARTAN POTASSIUM 25 MG: 25 TABLET, FILM COATED ORAL at 16:54

## 2023-08-15 RX ADMIN — DEXTRAN 70, GLYCERIN, HYPROMELLOSE 1 DROP: 1; 2; 3 SOLUTION/ DROPS OPHTHALMIC at 09:11

## 2023-08-15 RX ADMIN — ASPIRIN 81 MG: 81 TABLET, CHEWABLE ORAL at 16:55

## 2023-08-15 RX ADMIN — PANCRELIPASE 6000 UNITS: 30000; 6000; 19000 CAPSULE, DELAYED RELEASE PELLETS ORAL at 16:56

## 2023-08-15 RX ADMIN — SODIUM CHLORIDE, PRESERVATIVE FREE 10 ML: 5 INJECTION INTRAVENOUS at 21:08

## 2023-08-15 RX ADMIN — METOPROLOL SUCCINATE 50 MG: 25 TABLET, EXTENDED RELEASE ORAL at 21:08

## 2023-08-15 RX ADMIN — PANTOPRAZOLE SODIUM 40 MG: 40 TABLET, DELAYED RELEASE ORAL at 09:10

## 2023-08-15 ASSESSMENT — ENCOUNTER SYMPTOMS
ABDOMINAL PAIN: 0
VOMITING: 0
BACK PAIN: 0
COUGH: 0
DIARRHEA: 0
SHORTNESS OF BREATH: 1
CONSTIPATION: 0
NAUSEA: 0

## 2023-08-15 ASSESSMENT — PAIN DESCRIPTION - ORIENTATION: ORIENTATION: LEFT

## 2023-08-15 ASSESSMENT — PAIN DESCRIPTION - DESCRIPTORS: DESCRIPTORS: ACHING

## 2023-08-15 ASSESSMENT — PAIN SCALES - GENERAL
PAINLEVEL_OUTOF10: 5
PAINLEVEL_OUTOF10: 0

## 2023-08-15 ASSESSMENT — PAIN DESCRIPTION - LOCATION: LOCATION: LEG

## 2023-08-15 NOTE — PLAN OF CARE
Problem: Discharge Planning  Goal: Discharge to home or other facility with appropriate resources  8/15/2023 0006 by Lety Dunham RN  Outcome: Progressing  Flowsheets  Taken 8/15/2023 0006  Discharge to home or other facility with appropriate resources:   Identify barriers to discharge with patient and caregiver   Arrange for needed discharge resources and transportation as appropriate   Identify discharge learning needs (meds, wound care, etc)  Taken 8/14/2023 2333  Discharge to home or other facility with appropriate resources: Identify barriers to discharge with patient and caregiver     Problem: Skin/Tissue Integrity  Goal: Absence of new skin breakdown  Description: 1. Monitor for areas of redness and/or skin breakdown  2. Assess vascular access sites hourly  3. Every 4-6 hours minimum:  Change oxygen saturation probe site  4. Every 4-6 hours:  If on nasal continuous positive airway pressure, respiratory therapy assess nares and determine need for appliance change or resting period. Outcome: Progressing     Problem: Safety - Adult  Goal: Free from fall injury  8/15/2023 0006 by Lety Dunham RN  Outcome: Progressing  Flowsheets  Taken 8/15/2023 0006  Free From Fall Injury: Instruct family/caregiver on patient safety  Taken 8/15/2023 0005  Free From Fall Injury: Instruct family/caregiver on patient safety     Problem: Confusion  Goal: Confusion, delirium, dementia, or psychosis is improved or at baseline  Description: INTERVENTIONS:  1. Assess for possible contributors to thought disturbance, including medications, impaired vision or hearing, underlying metabolic abnormalities, dehydration, psychiatric diagnoses, and notify attending LIP  2. [place] high risk fall precautions, as indicated  3. Provide frequent short contacts to provide reality reorientation, refocusing and direction  4. Decrease environmental stimuli, including noise as appropriate  5.  Monitor and intervene to maintain adequate

## 2023-08-15 NOTE — CONSULTS
Clinical Pharmacy Progress Note    Warfarin - Management by Pharmacy    Consult Date(s): 8/14  Consulting Provider(s): Dr. Melia Raymundo / [place]  PE h/o - Warfarin  Goal INR: 2 - 3  Concurrent Anticoagulants / Antiplatelets: none   Interactions: No significant interactions noted. Current Regimen / Plan:   Pt takes warfarin 2.5 mg once daily  Will continue home dose current INR is 2.74     Will monitor pt's clinical status and INR daily, and make dose adjustments as needed. Thank you for consulting pharmacy,    Rufino Bojorquez, PharmD  PGY1 Resident   Ext. [phone]  8/15/2023 9:17 AM        Interval update:  none     Subjective/Objective:   Jose Singleton is a 78 y.o. female with a PMHx significant for ESRD w/ MWF HD, CHF, T2DM on insuling s/p BLE amputation, PE, Stroke, HTN, and COPD who is admitted for SOB. Pharmacy is consulted to dose warfarin.     Ht Readings from Last 1 Encounters:   08/14/23 4' 4\" (1.321 m)     Wt Readings from Last 1 Encounters:   08/15/23 110 lb 7.2 oz (50.1 kg)     Prior / Home Warfarin Regimen:  2.5 mg once daily     Date INR Warfarin   8/14 2.4 2.5   8/15 2.74                  Recent Labs     08/14/23  0200 08/14/23  0534 08/14/23  1350 08/15/23  0515   INR  --   --  2.40* 2.74*   HGB 10.8* 10.8*  --  10.5*    214  --  219   CREATININE 5.0* 5.2*  --  3.6*

## 2023-08-15 NOTE — CARE COORDINATION
Case Management Assessment           Daily Note                 Date/ Time of Note: 8/15/2023 12:47 PM         Note completed by: Lynda Santos    Patient Name: Sandy Pandey  YOB: 1945    Diagnosis:ESRD (end stage renal disease) [facility]) [N18.6]  Patient Admission Status: Inpatient  Date of Admission:8/14/2023  1:28 AM    Length of Stay: 1 GLOS: GMLOS: 3.9 Readmission Risk Score: Readmission Risk Score: 17.3    Current Plan of Care: HD, echo  ________________________________________________________________________________________  Discharge Plan: [address] [place] [address] ([facility]): [place] of [address] required for SNF: NO  COVID Result:    Lab Results   Component Value Date/Time    COVID19 Not Detected 08/14/2023 02:08 AM       Transportation PLAN for discharge: EMS transportation    Tentative discharge date: 8/16    Potential assistance Purchasing Medications: Potential Assistance Purchasing Medications: No  Does Patient want to participate in local refill/ meds to beds program?: No    Current barriers to discharge: Confusion, Long standing deficits, and Medical complications    Referrals completed: Not Applicable    Resources/ information provided: Not indicated at this time  ________________________________________________________________________________________  Case Management Notes:     CM attempted to reach out to pt's daughter, Frances Walden, regarding DCP- to confirm pt will return to LT at [address]. Frances Walden did not answer, HIPAA compliant VM left. Shanti Hollins and her family were provided with choice of provider; she and her family are in agreement with the discharge plan.     Emergency Contacts:  Extended Emergency Contact Information  Primary Emergency Contact: [facility] Phone: [phone]  Relation: Child  Secondary Emergency Contact: [facility] [facility] Phone: [phone]  Relation: Child    Care Transition Patient: No    IMM Status:      Alen Ramírez [facility]  Case Management Department  Ph: [phone]

## 2023-08-15 NOTE — PROGRESS NOTES
Assessment & Plan: Tolerated dialysis well yesterday, with 2.5L UF. Dialysis today, 2h session and aim for removal 1L. BP stable, 983'A systolic. Continue to monitor closely. Monitor daily weight, I/O's; follow labs. Renally dose of medications, avoid nephrotoxins. Thank you for allowing us to participate in this patient's care     In case of any question please call us at our 24 hour answering service [phone] or from 7 AM to 5 PM via [address] or cell number     Lyssa Grimaldo MD  [facility] Nephrology  [address] [facility], [address] [place] [address] Avenue  Fax: [phone]  Office: [phone]     Subjective:     CC: SOB. HPI:  Dena Espinoza is a 78 y.o. with PMH ESRD on HD (MWF; R forearm fistula), s/p B/L AKA, CHF, CAD, CVA, T2DM who presented with SOB from nursing home. She endorsed having SOB since the morning of admission. In the ED, patient required 3L NC to maintain saturations over 90% Labs in the ED showed K5.4 (hemolyzed), mild acidosis with chloride of 96 and chronic creatinine elevation to 5.0.  CBC showed chronic anemia. Troponin was elevated to 1.5.  proBNP was around 40,000. VBG showed pH of 7.329, PCO2 53.3 with PO2 51.6. CXR with evidence of cardiac enlargement, perihilar and bibasilar hazy attenuation favoring CHF. There were also bilateral pleural effusions noted. Patient receives hemodialysis on MWF, through R forearm fistula. She endorses no missed dialysis sessions, stating that her last dialysis has been done on Friday. She follows with nephrology at [facility]. Of note, she has had previous admissions in 2022 for volume overload necessitating emergent HD. She has been seen by Dr. Enoch Shipley in the past ( Nephrology). Interval History  Feeling improved today compared to yesterday. Feels less SOB however still feeling more SOB than baseline. O2 requirement down to 2L from 5L. She is more alert, able to answer orientation questions with more ease.  Denies cp,

## 2023-08-15 NOTE — PROGRESS NOTES
removed 800mls net fluid complained of pain in left healed stump, placed leg on pillow no sores noted, gave tylenol for comfort. pre bp 159/75 post bp 152/67 pre weight 50.1kg.  post weight 49.0kg bed scales.   2 bandages to bipin avg no bleeding dressing cdi. 250 bfr 2 hour extra tx MWF

## 2023-08-15 NOTE — CARE COORDINATION
Case Management Assessment  Initial Evaluation    Date/Time of Evaluation: 8/14/23 at 1600  Assessment Completed by: Chhaya Mitchell    If patient is discharged prior to next notation, then this note serves as note for discharge by case management. Patient Name: Neil Horton                   YOB: 1945  Diagnosis: ESRD (end stage renal disease) ([address]) [N18.6]                   Date / Time: 8/14/2023  1:28 AM    Patient Admission Status: Inpatient   Readmission Risk (Low < 19, Mod (19-27), High > 27): Readmission Risk Score: 17.3    Current PCP: Rambo Phillip MD  PCP verified by CM? No    Chart Reviewed: Yes      History Provided by: Medical Record, Other (see comment) (LTCF)  Patient Orientation: Other (see comment) (AMS)    Patient Cognition: Alert    Hospitalization in the last 30 days (Readmission):  No    If yes, Readmission Assessment in  Navigator will be completed. Advance Directives:      Code Status: DNR-CCA   Patient's Primary Decision Maker is: Legal Next of Kin      Discharge Planning:    Patient lives with: Alone Type of Home: Long-Term Care  Primary Care Giver: Other (Comment) (LTC)  Patient Support Systems include: Family Members   Current Financial resources: Medicare  Current community resources: ECF/Home Care  Current services prior to admission: Extended Care Facility            Current DME:              Type of Home Care services:  None    ADLS  Prior functional level: Assistance with the following:, Bathing, Dressing, Toileting, Cooking, Housework, Shopping, Mobility  Current functional level: Assistance with the following:, Bathing, Dressing, Toileting, Cooking, Housework, Shopping, Mobility    PT AM-PAC:   /24  OT AM-PAC:   /24    Family can provide assistance at DC: No  Would you like Case Management to discuss the discharge plan with any other family members/significant others, and if so, who?  Yes (Manuelito sorto)  Plans to Return to Present Housing: Yes  Other Identified Issues/Barriers to RETURNING to current housing: none  Potential Assistance needed at discharge: Extended Care Facility            Potential DME:    Patient expects to discharge to: Long-term care  Plan for transportation at discharge:      Financial    Payor: MEDICARE / Plan: MEDICARE PART A AND B / Product Type: *No Product type* /     Does insurance require precert for SNF: No    Potential assistance Purchasing Medications: No  Meds-to-Beds request: No    No Pharmacies Listed    Notes:    Factors facilitating achievement of predicted outcomes: Family support and LTC    Barriers to discharge: Confusion, Long standing deficits, and Medical complications    Additional Case Management Notes:     CM following for DCP. Pt from [facility] of [address] with outpt HD chair & transport- follows with nephrology at [facility]. CM spoke with Danya Mohamud in admissions at [facility] [place] pt is a paid bed hold and can return when medically ready. Danya somers pt is reported to be, at baseline, alert and oriented x4 and x2 assist to transfer into wheelchair. The Plan for Transition of Care is related to the following treatment goals of ESRD (end stage renal disease) ([address]) [V60.1]    IF APPLICABLE: The Patient and/or patient representative Wilma Bower and her family were provided with a choice of provider and agrees with the discharge plan. Freedom of choice list with basic dialogue that supports the patient's individualized plan of care/goals and shares the quality data associated with the providers was provided to: Patient   Patient Representative Name:       The Patient and/or Patient Representative Agree with the Discharge Plan?  Yes    [facility]  Case Management Department  Ph: [phone]

## 2023-08-16 LAB
ANION GAP SERPL CALCULATED.3IONS-SCNC: 12 MMOL/L (ref 3–16)
BASOPHILS # BLD: 0.1 K/UL (ref 0–0.2)
BASOPHILS NFR BLD: 1 %
BUN SERPL-MCNC: 26 MG/DL (ref 7–20)
CALCIUM SERPL-MCNC: 8.5 MG/DL (ref 8.3–10.6)
CHLORIDE SERPL-SCNC: 93 MMOL/L (ref 99–110)
CO2 SERPL-SCNC: 26 MMOL/L (ref 21–32)
CREAT SERPL-MCNC: 4.4 MG/DL (ref 0.6–1.2)
DEPRECATED RDW RBC AUTO: 15.5 % (ref 12.4–15.4)
EOSINOPHIL # BLD: 0.9 K/UL (ref 0–0.6)
EOSINOPHIL NFR BLD: 17.3 %
FOLATE SERPL-MCNC: 8.21 NG/ML (ref 4.78–24.2)
GFR SERPLBLD CREATININE-BSD FMLA CKD-EPI: 10 ML/MIN/{1.73_M2}
GLUCOSE BLD-MCNC: 64 MG/DL (ref 70–99)
GLUCOSE BLD-MCNC: 64 MG/DL (ref 70–99)
GLUCOSE BLD-MCNC: 80 MG/DL (ref 70–99)
GLUCOSE BLD-MCNC: 85 MG/DL (ref 70–99)
GLUCOSE BLD-MCNC: 93 MG/DL (ref 70–99)
GLUCOSE BLD-MCNC: 97 MG/DL (ref 70–99)
GLUCOSE SERPL-MCNC: 73 MG/DL (ref 70–99)
HAV IGM SERPL QL IA: NORMAL
HBV CORE IGM SERPL QL IA: NORMAL
HBV SURFACE AG SERPL QL IA: NORMAL
HCT VFR BLD AUTO: 28.4 % (ref 36–48)
HCV AB SERPL QL IA: NORMAL
HGB BLD-MCNC: 9.7 G/DL (ref 12–16)
INR PPP: 2.84 (ref 0.84–1.16)
LYMPHOCYTES # BLD: 1.3 K/UL (ref 1–5.1)
LYMPHOCYTES NFR BLD: 24.8 %
MAGNESIUM SERPL-MCNC: 1.7 MG/DL (ref 1.8–2.4)
MCH RBC QN AUTO: 34.4 PG (ref 26–34)
MCHC RBC AUTO-ENTMCNC: 34 G/DL (ref 31–36)
MCV RBC AUTO: 101.2 FL (ref 80–100)
MONOCYTES # BLD: 0.8 K/UL (ref 0–1.3)
MONOCYTES NFR BLD: 15 %
NEUTROPHILS # BLD: 2.2 K/UL (ref 1.7–7.7)
NEUTROPHILS NFR BLD: 41.9 %
PERFORMED ON: ABNORMAL
PERFORMED ON: ABNORMAL
PERFORMED ON: NORMAL
PLATELET # BLD AUTO: 189 K/UL (ref 135–450)
PMV BLD AUTO: 9.7 FL (ref 5–10.5)
POTASSIUM SERPL-SCNC: 4.2 MMOL/L (ref 3.5–5.1)
PROTHROMBIN TIME: 29.6 SEC (ref 11.5–14.8)
RBC # BLD AUTO: 2.81 M/UL (ref 4–5.2)
SODIUM SERPL-SCNC: 131 MMOL/L (ref 136–145)
T4 FREE SERPL-MCNC: 1.9 NG/DL (ref 0.9–1.8)
TSH SERPL DL<=0.005 MIU/L-ACNC: 4.42 UIU/ML (ref 0.27–4.2)
VIT B12 SERPL-MCNC: 1157 PG/ML (ref 211–911)
WBC # BLD AUTO: 5.2 K/UL (ref 4–11)

## 2023-08-16 PROCEDURE — 85610 PROTHROMBIN TIME: CPT

## 2023-08-16 PROCEDURE — 2580000003 HC RX 258

## 2023-08-16 PROCEDURE — 6370000000 HC RX 637 (ALT 250 FOR IP)

## 2023-08-16 PROCEDURE — 1200000000 HC SEMI PRIVATE

## 2023-08-16 PROCEDURE — 2700000000 HC OXYGEN THERAPY PER DAY

## 2023-08-16 PROCEDURE — 80048 BASIC METABOLIC PNL TOTAL CA: CPT

## 2023-08-16 PROCEDURE — 6370000000 HC RX 637 (ALT 250 FOR IP): Performed by: INTERNAL MEDICINE

## 2023-08-16 PROCEDURE — 84443 ASSAY THYROID STIM HORMONE: CPT

## 2023-08-16 PROCEDURE — 85025 COMPLETE CBC W/AUTO DIFF WBC: CPT

## 2023-08-16 PROCEDURE — 94761 N-INVAS EAR/PLS OXIMETRY MLT: CPT

## 2023-08-16 PROCEDURE — 90935 HEMODIALYSIS ONE EVALUATION: CPT

## 2023-08-16 PROCEDURE — 6360000002 HC RX W HCPCS: Performed by: STUDENT IN AN ORGANIZED HEALTH CARE EDUCATION/TRAINING PROGRAM

## 2023-08-16 PROCEDURE — 83735 ASSAY OF MAGNESIUM: CPT

## 2023-08-16 PROCEDURE — 36415 COLL VENOUS BLD VENIPUNCTURE: CPT

## 2023-08-16 PROCEDURE — 84681 ASSAY OF C-PEPTIDE: CPT

## 2023-08-16 PROCEDURE — 84439 ASSAY OF FREE THYROXINE: CPT

## 2023-08-16 RX ORDER — LANOLIN ALCOHOL/MO/W.PET/CERES
400 CREAM (GRAM) TOPICAL ONCE
Status: COMPLETED | OUTPATIENT
Start: 2023-08-16 | End: 2023-08-16

## 2023-08-16 RX ORDER — CARVEDILOL 6.25 MG/1
6.25 TABLET ORAL 2 TIMES DAILY WITH MEALS
Status: DISCONTINUED | OUTPATIENT
Start: 2023-08-16 | End: 2023-08-17 | Stop reason: HOSPADM

## 2023-08-16 RX ORDER — LOSARTAN POTASSIUM 50 MG/1
50 TABLET ORAL DAILY
Status: DISCONTINUED | OUTPATIENT
Start: 2023-08-17 | End: 2023-08-17

## 2023-08-16 RX ORDER — LOSARTAN POTASSIUM 25 MG/1
25 TABLET ORAL ONCE
Status: COMPLETED | OUTPATIENT
Start: 2023-08-16 | End: 2023-08-16

## 2023-08-16 RX ORDER — LANOLIN ALCOHOL/MO/W.PET/CERES
400 CREAM (GRAM) TOPICAL DAILY
Status: DISCONTINUED | OUTPATIENT
Start: 2023-08-16 | End: 2023-08-16

## 2023-08-16 RX ADMIN — ATORVASTATIN CALCIUM 40 MG: 40 TABLET, FILM COATED ORAL at 08:05

## 2023-08-16 RX ADMIN — PANTOPRAZOLE SODIUM 40 MG: 40 TABLET, DELAYED RELEASE ORAL at 20:56

## 2023-08-16 RX ADMIN — PANCRELIPASE 6000 UNITS: 30000; 6000; 19000 CAPSULE, DELAYED RELEASE PELLETS ORAL at 20:57

## 2023-08-16 RX ADMIN — SODIUM CHLORIDE, PRESERVATIVE FREE 10 ML: 5 INJECTION INTRAVENOUS at 08:05

## 2023-08-16 RX ADMIN — PANTOPRAZOLE SODIUM 40 MG: 40 TABLET, DELAYED RELEASE ORAL at 08:05

## 2023-08-16 RX ADMIN — DEXTRAN 70, GLYCERIN, HYPROMELLOSE 1 DROP: 1; 2; 3 SOLUTION/ DROPS OPHTHALMIC at 20:58

## 2023-08-16 RX ADMIN — LOSARTAN POTASSIUM 25 MG: 25 TABLET, FILM COATED ORAL at 17:03

## 2023-08-16 RX ADMIN — ASPIRIN 81 MG: 81 TABLET, CHEWABLE ORAL at 08:05

## 2023-08-16 RX ADMIN — WARFARIN SODIUM 2.5 MG: 2.5 TABLET ORAL at 17:02

## 2023-08-16 RX ADMIN — AMLODIPINE BESYLATE 10 MG: 10 TABLET ORAL at 17:02

## 2023-08-16 RX ADMIN — SODIUM CHLORIDE, PRESERVATIVE FREE 10 ML: 5 INJECTION INTRAVENOUS at 20:58

## 2023-08-16 RX ADMIN — ACETAMINOPHEN 650 MG: 325 TABLET ORAL at 13:19

## 2023-08-16 RX ADMIN — Medication 400 MG: at 13:19

## 2023-08-16 RX ADMIN — EPOETIN ALFA-EPBX 6000 UNITS: 3000 INJECTION, SOLUTION INTRAVENOUS; SUBCUTANEOUS at 11:04

## 2023-08-16 RX ADMIN — Medication 5000 UNITS: at 08:05

## 2023-08-16 RX ADMIN — CARVEDILOL 6.25 MG: 6.25 TABLET, FILM COATED ORAL at 17:02

## 2023-08-16 RX ADMIN — PANCRELIPASE 6000 UNITS: 30000; 6000; 19000 CAPSULE, DELAYED RELEASE PELLETS ORAL at 13:19

## 2023-08-16 RX ADMIN — LOSARTAN POTASSIUM 25 MG: 25 TABLET, FILM COATED ORAL at 08:05

## 2023-08-16 RX ADMIN — MIRTAZAPINE 7.5 MG: 15 TABLET, FILM COATED ORAL at 20:56

## 2023-08-16 ASSESSMENT — ENCOUNTER SYMPTOMS
VOMITING: 0
BACK PAIN: 0
NAUSEA: 0
SHORTNESS OF BREATH: 1
COUGH: 0

## 2023-08-16 ASSESSMENT — PAIN - FUNCTIONAL ASSESSMENT
PAIN_FUNCTIONAL_ASSESSMENT: PREVENTS OR INTERFERES WITH ALL ACTIVE AND SOME PASSIVE ACTIVITIES
PAIN_FUNCTIONAL_ASSESSMENT: PREVENTS OR INTERFERES WITH ALL ACTIVE AND SOME PASSIVE ACTIVITIES

## 2023-08-16 ASSESSMENT — PAIN DESCRIPTION - FREQUENCY: FREQUENCY: INTERMITTENT

## 2023-08-16 ASSESSMENT — PAIN DESCRIPTION - ORIENTATION
ORIENTATION: RIGHT
ORIENTATION: RIGHT

## 2023-08-16 ASSESSMENT — PAIN DESCRIPTION - LOCATION
LOCATION: LEG
LOCATION: LEG

## 2023-08-16 ASSESSMENT — PAIN SCALES - GENERAL
PAINLEVEL_OUTOF10: 3
PAINLEVEL_OUTOF10: 0
PAINLEVEL_OUTOF10: 0
PAINLEVEL_OUTOF10: 3

## 2023-08-16 ASSESSMENT — PAIN DESCRIPTION - PAIN TYPE: TYPE: CHRONIC PAIN

## 2023-08-16 ASSESSMENT — PAIN DESCRIPTION - DESCRIPTORS
DESCRIPTORS: SHARP
DESCRIPTORS: SHARP;OTHER (COMMENT)

## 2023-08-16 ASSESSMENT — PAIN DESCRIPTION - ONSET: ONSET: ON-GOING

## 2023-08-16 NOTE — CONSULTS
Comprehensive Nutrition Assessment    RECOMMENDATIONS:  PO Diet: Regular, 3CC, Cardiac, low K+, low phos, 1500 ml fluid rest.  ONS: Nepro daily   Nutrition Education: Education not indicated     NUTRITION ASSESSMENT:   Nutritional summary & status: Consult r/t poor intake/appetite. Pt off floor during attempted RD visit, suspect in dialysis. Noted from H&P, pt presenting w/ ESRD on HD, T2DM s/p BLE amputation. To note, pt is poor historian per chart review. MST 0 w/ no wt loss reported by pt or noted per chart review. Pt is on appropriate diet for dx(s) w/ PO intake ave 26-50% most meals since admit. Suspect po intake inadequate in meeting EEN w/ current intakes in place. Will order nepro daily w/ meals in order to encourage adequate kcal/pro intake during LOS. Will monitor acceptance & modify prn. RD following. Admission // PMH: PMHx of ESRD w/ MWF HD, CHF, T2DM on insulin s/p BLE amputation, PE, Stroke, HTN, CAD, and  who presented from nursing home with SOB    MALNUTRITION ASSESSMENT  Context of Malnutrition: Chronic Illness   Malnutrition Status: Insufficient data  Findings of the 6 clinical characteristics of malnutrition (Minimum of 2 out of 6 clinical characteristics is required to make the diagnosis of moderate or severe Protein Calorie Malnutrition based on AND/ASPEN Guidelines):  Energy Intake:  Unable to assess  Weight Loss:  Unable to assess     Body Fat Loss:  Unable to assess     Muscle Mass Loss:  Unable to assess      NUTRITION DIAGNOSIS   Increased nutrient needs related to increase demand for energy/nutrients as evidenced by dialysis    Nutrition Monitoring and Evaluation:   Food/Nutrient Intake Outcomes:  Food and Nutrient Intake, Supplement Intake  Physical Signs/Symptoms Outcomes:  Biochemical Data, Weight, Nutrition Focused Physical Findings, Hemodynamic Status     OBJECTIVE DATA: Significant to nutrition assessment  Nutrition Related Findings: +BM 8/14. Trace BLE edema.  Na 131, BUN 26, Cr 4.4, GFR 10, Mg 1.70  Wounds: None  Nutrition Goals: PO intake 75% or greater, prior to discharge     [address] DIET; Regular; 3 carb choices (45 gm/meal); Low Fat/Low Chol/High Fiber/2 gm Na; Low Potassium (Less than 3000 mg/day); Low Phosphorus (Less than 1000 mg); 1500 ml  ADULT ORAL NUTRITION SUPPLEMENT; Lunch; Renal Oral Supplement  PO Intake: 26-50%   PO Supplement Intake:None Ordered  Additional Sources of Calories/IVF:n/a     COMPARATIVE STANDARDS  Energy (kcal):  7408-1323 (20-25 kcal/kg CBW)     Protein (g):  50-60 (1.0-1.2 g/kg CBW)       Fluid (ml/day):  or per MD    ANTHROPOMETRICS  Current Height: 4' 4\" (132.1 cm)  Current Weight - Scale: 111 lb 8.8 oz (50.6 kg)    Admission weight: 113 lb 11.2 oz (51.6 kg)    The patient will be monitored per nutrition standards of care. Consult dietitian if additional nutrition interventions are needed prior to RD reassessment.      Matthew Strong, [address]:  [phone]  Office:  [phone]

## 2023-08-16 NOTE — PROGRESS NOTES
Assessment & Plan: Tolerated UF yesterday. Resume regular dialysis today, tolerating well. Plan for 3h session with 1L removal.   BP better, 400'Y systolic. Off all oxygen supplementation. Continue to monitor closely. Monitor daily weight, I/O's; follow labs. Renally dose of medications, avoid nephrotoxins. Thank you for allowing us to participate in this patient's care     In case of any question please call us at our 24 hour answering service [phone] or from 7 AM to 5 PM via [employer] or cell number     Concepcion Nyhan, MD  [facility] Nephrology  [address] [facility], [facility] [address]  Fax: [phone]  Office: [phone]     Subjective:     CC: SOB. HPI:  Amelia rBaga is a 78 y.o. with PMH ESRD on HD (MWF; R forearm fistula), s/p B/L AKA, CHF, CAD, CVA, T2DM who presented with SOB from nursing home. She endorsed having SOB since the morning of admission. In the ED, patient required 3L NC to maintain saturations over 90% Labs in the ED showed K5.4 (hemolyzed), mild acidosis with chloride of 96 and chronic creatinine elevation to 5.0.  CBC showed chronic anemia. Troponin was elevated to 1.5.  proBNP was around 40,000. VBG showed pH of 7.329, PCO2 53.3 with PO2 51.6. CXR with evidence of cardiac enlargement, perihilar and bibasilar hazy attenuation favoring CHF. There were also bilateral pleural effusions noted. Patient receives hemodialysis on MWF, through R forearm fistula. She endorses no missed dialysis sessions, stating that her last dialysis has been done on Friday. She follows with nephrology at [facility]. Of note, she has had previous admissions in 2022 for volume overload necessitating emergent HD. She has been seen by Dr. Mike Ryan in the past ( Nephrology). Interval History  Seen in dialysis today. Feeling improved today compared to yesterday. Off O2 though complaining of some SOB still. She is more alert, able to answer orientation questions with more ease. 08/14/23  0534 08/15/23  0515 08/16/23  0428   * 137 131*   K 4.5 3.8 4.2   CL 96* 96* 93*   CO2 26 30 26   BUN 39* 19 26*   CREATININE 5.2* 3.6* 4.4*   GLUCOSE 152* 75 73   MG 1.90 1.80 1.70*

## 2023-08-16 NOTE — PROGRESS NOTES
Patient is alert to self and situation. VSS. Denies pain. Lung sounds diminished. No respiratory distress. Able to be weaned off of oxygen completely, satting well. Turned and repositioned frequently. Tolerating diet. Fall precautions in place.

## 2023-08-16 NOTE — PROGRESS NOTES
Treatment time: 3 hours  Net UF: 1600 ml     Pre weight: 50.6 kg  Post weight:49.0 kg    Access used: lavf    Access function: well with  ml/min     Medications or blood products given: retacrit 6000 units     Regular outpatient schedule: mwf     Summary of response to treatment: Patient tolerated treatment well and without any complications.  Patient remained stable throughout entire treatment

## 2023-08-16 NOTE — PLAN OF CARE
Problem: Safety - Adult  Goal: Free from fall injury  Outcome: Progressing  Flowsheets (Taken 8/15/2023 2126 by Rohini Morales RN)  Free From Fall Injury: Instruct family/caregiver on patient safety   Patient has remained free of falls. 2/4 bed rails up, bed locked and in lowest position, call light within reach. Patient instructed on use of call light and uses appropriately. Bed alarm on. Non-skid footwear and fall band on. Problem: Pain  Goal: Verbalizes/displays adequate comfort level or baseline comfort level  Outcome: Progressing  Flowsheets (Taken 8/15/2023 2057 by Rohini Morales RN)  Verbalizes/displays adequate comfort level or baseline comfort level: Encourage patient to monitor pain and request assistance   Numeric pain rating scale being used. Patient repositioned for comfort.

## 2023-08-16 NOTE — PROGRESS NOTES
Clinical Pharmacy Progress Note    Warfarin - Management by Pharmacy    Consult Date(s): 8/14  Consulting Provider(s): Dr. Milan Bunch / Plan  PE h/o - Warfarin  Goal INR: 2 - 3  Concurrent Anticoagulants / Antiplatelets: none   Interactions: No significant interactions noted. Current Regimen / Plan:   Pt takes warfarin 2.5 mg once daily at home   Will continue home dose current INR is 2.84     Will monitor pt's clinical status and INR daily, and make dose adjustments as needed. Thank you for consulting pharmacy,    Theresa Knight, PharmD  PGY1 Resident   Ext. [phone]  8/16/2023 11:13 AM        Interval update:  HGB decreased from 10.5 to 9.7 overnight      Subjective/Objective:   Margot Neville is a 78 y.o. female with a PMHx significant for ESRD w/ MWF HD, CHF, T2DM on insuling s/p BLE amputation, PE, Stroke, HTN, and COPD who is admitted for SOB. Pharmacy is consulted to dose warfarin.     Ht Readings from Last 1 Encounters:   08/14/23 4' 4\" (1.321 m)     Wt Readings from Last 1 Encounters:   08/16/23 111 lb 8.8 oz (50.6 kg)     Prior / Home Warfarin Regimen:  2.5 mg once daily     Date INR Warfarin   8/14 2.4 2.5   8/15 2.74 2.5   8/16 2.84             Recent Labs     08/14/23  0534 08/14/23  1350 08/15/23  0515 08/16/23  0428   INR  --  2.40* 2.74* 2.84*   HGB 10.8*  --  10.5* 9.7*     --  219 189   LABALBU  --   --  3.8  --    CREATININE 5.2*  --  3.6* 4.4*

## 2023-08-16 NOTE — PLAN OF CARE
Problem: Discharge Planning  Goal: Discharge to home or other facility with appropriate resources  Outcome: Progressing     Problem: Skin/Tissue Integrity  Goal: Absence of new skin breakdown  Description: 1. Monitor for areas of redness and/or skin breakdown  2. Assess vascular access sites hourly  3. Every 4-6 hours minimum:  Change oxygen saturation probe site  4. Every 4-6 hours:  If on nasal continuous positive airway pressure, respiratory therapy assess nares and determine need for appliance change or resting period. Outcome: Progressing  Note: Q2h turning/repositioning done this shift to prevent further skin breakdown. Pt shows no new sign of skin breakdown. Will continue to monitor. Problem: Safety - Adult  Goal: Free from fall injury  Outcome: Progressing  Note: No falls noted thus far this shift, bed in lowest position, alarm on, non-skid socks on, call light within reach, hourly checks, safety maintained, will continue to monitor. Problem: Confusion  Goal: Confusion, delirium, dementia, or psychosis is improved or at baseline  Description: INTERVENTIONS:  1. Assess for possible contributors to thought disturbance, including medications, impaired vision or hearing, underlying metabolic abnormalities, dehydration, psychiatric diagnoses, and notify attending LIP  2. [place] high risk fall precautions, as indicated  3. Provide frequent short contacts to provide reality reorientation, refocusing and direction  4. Decrease environmental stimuli, including noise as appropriate  5. Monitor and intervene to maintain adequate nutrition, hydration, elimination, sleep and activity  6. If unable to ensure safety without constant attention obtain sitter and review sitter guidelines with assigned personnel  7.  Initiate Psychosocial CNS and Spiritual Care consult, as indicated  Outcome: Progressing     Problem: Pain  Goal: Verbalizes/displays adequate comfort level or baseline comfort level  Outcome: Progressing

## 2023-08-17 VITALS
SYSTOLIC BLOOD PRESSURE: 150 MMHG | RESPIRATION RATE: 16 BRPM | TEMPERATURE: 98.2 F | HEART RATE: 68 BPM | DIASTOLIC BLOOD PRESSURE: 54 MMHG | BODY MASS INDEX: 24.34 KG/M2 | WEIGHT: 105.16 LBS | OXYGEN SATURATION: 94 % | HEIGHT: 55 IN

## 2023-08-17 LAB
ANION GAP SERPL CALCULATED.3IONS-SCNC: 9 MMOL/L (ref 3–16)
BASOPHILS # BLD: 0 K/UL (ref 0–0.2)
BASOPHILS NFR BLD: 1 %
BUN SERPL-MCNC: 13 MG/DL (ref 7–20)
CALCIUM SERPL-MCNC: 9.1 MG/DL (ref 8.3–10.6)
CHLORIDE SERPL-SCNC: 97 MMOL/L (ref 99–110)
CO2 SERPL-SCNC: 29 MMOL/L (ref 21–32)
CREAT SERPL-MCNC: 3.3 MG/DL (ref 0.6–1.2)
DEPRECATED RDW RBC AUTO: 16 % (ref 12.4–15.4)
EOSINOPHIL # BLD: 0.5 K/UL (ref 0–0.6)
EOSINOPHIL NFR BLD: 10.6 %
GFR SERPLBLD CREATININE-BSD FMLA CKD-EPI: 14 ML/MIN/{1.73_M2}
GLUCOSE BLD-MCNC: 105 MG/DL (ref 70–99)
GLUCOSE BLD-MCNC: 73 MG/DL (ref 70–99)
GLUCOSE BLD-MCNC: 79 MG/DL (ref 70–99)
GLUCOSE BLD-MCNC: 79 MG/DL (ref 70–99)
GLUCOSE BLD-MCNC: 83 MG/DL (ref 70–99)
GLUCOSE SERPL-MCNC: 91 MG/DL (ref 70–99)
HCT VFR BLD AUTO: 31 % (ref 36–48)
HGB BLD-MCNC: 10.4 G/DL (ref 12–16)
INR PPP: 3.06 (ref 0.84–1.16)
LYMPHOCYTES # BLD: 1.1 K/UL (ref 1–5.1)
LYMPHOCYTES NFR BLD: 22.3 %
MAGNESIUM SERPL-MCNC: 1.8 MG/DL (ref 1.8–2.4)
MCH RBC QN AUTO: 33.4 PG (ref 26–34)
MCHC RBC AUTO-ENTMCNC: 33.6 G/DL (ref 31–36)
MCV RBC AUTO: 99.2 FL (ref 80–100)
MONOCYTES # BLD: 0.8 K/UL (ref 0–1.3)
MONOCYTES NFR BLD: 16.8 %
NEUTROPHILS # BLD: 2.5 K/UL (ref 1.7–7.7)
NEUTROPHILS NFR BLD: 49.3 %
PERFORMED ON: ABNORMAL
PERFORMED ON: NORMAL
PLATELET # BLD AUTO: 219 K/UL (ref 135–450)
PMV BLD AUTO: 9.7 FL (ref 5–10.5)
POTASSIUM SERPL-SCNC: 4 MMOL/L (ref 3.5–5.1)
PROTHROMBIN TIME: 31.4 SEC (ref 11.5–14.8)
RBC # BLD AUTO: 3.13 M/UL (ref 4–5.2)
SODIUM SERPL-SCNC: 135 MMOL/L (ref 136–145)
WBC # BLD AUTO: 5 K/UL (ref 4–11)

## 2023-08-17 PROCEDURE — 6370000000 HC RX 637 (ALT 250 FOR IP): Performed by: INTERNAL MEDICINE

## 2023-08-17 PROCEDURE — 36415 COLL VENOUS BLD VENIPUNCTURE: CPT

## 2023-08-17 PROCEDURE — 6370000000 HC RX 637 (ALT 250 FOR IP): Performed by: STUDENT IN AN ORGANIZED HEALTH CARE EDUCATION/TRAINING PROGRAM

## 2023-08-17 PROCEDURE — 84681 ASSAY OF C-PEPTIDE: CPT

## 2023-08-17 PROCEDURE — 80048 BASIC METABOLIC PNL TOTAL CA: CPT

## 2023-08-17 PROCEDURE — 85610 PROTHROMBIN TIME: CPT

## 2023-08-17 PROCEDURE — 6370000000 HC RX 637 (ALT 250 FOR IP)

## 2023-08-17 PROCEDURE — 2580000003 HC RX 258

## 2023-08-17 PROCEDURE — 83735 ASSAY OF MAGNESIUM: CPT

## 2023-08-17 PROCEDURE — 85025 COMPLETE CBC W/AUTO DIFF WBC: CPT

## 2023-08-17 RX ORDER — LOSARTAN POTASSIUM 50 MG/1
50 TABLET ORAL DAILY
Qty: 30 TABLET | Refills: 3 | Status: SHIPPED | OUTPATIENT
Start: 2023-08-18 | End: 2023-08-17 | Stop reason: SDUPTHER

## 2023-08-17 RX ORDER — CARVEDILOL 6.25 MG/1
6.25 TABLET ORAL 2 TIMES DAILY WITH MEALS
Qty: 60 TABLET | Refills: 3 | Status: SHIPPED | OUTPATIENT
Start: 2023-08-17

## 2023-08-17 RX ORDER — LOSARTAN POTASSIUM 50 MG/1
50 TABLET ORAL ONCE
Status: COMPLETED | OUTPATIENT
Start: 2023-08-17 | End: 2023-08-17

## 2023-08-17 RX ORDER — LOSARTAN POTASSIUM 100 MG/1
100 TABLET ORAL DAILY
Status: DISCONTINUED | OUTPATIENT
Start: 2023-08-18 | End: 2023-08-17 | Stop reason: HOSPADM

## 2023-08-17 RX ORDER — ACYCLOVIR 400 MG/1
TABLET ORAL
Qty: 1 EACH | Refills: 4 | Status: SHIPPED | OUTPATIENT
Start: 2023-08-17

## 2023-08-17 RX ORDER — LOSARTAN POTASSIUM 100 MG/1
100 TABLET ORAL DAILY
Qty: 90 TABLET | Refills: 0 | Status: SHIPPED | OUTPATIENT
Start: 2023-08-18 | End: 2023-11-16

## 2023-08-17 RX ORDER — ACYCLOVIR 400 MG/1
TABLET ORAL
Qty: 1 EACH | Refills: 3 | Status: SHIPPED | OUTPATIENT
Start: 2023-08-17

## 2023-08-17 RX ADMIN — AMLODIPINE BESYLATE 10 MG: 10 TABLET ORAL at 16:49

## 2023-08-17 RX ADMIN — Medication 5000 UNITS: at 09:07

## 2023-08-17 RX ADMIN — SODIUM CHLORIDE, PRESERVATIVE FREE 10 ML: 5 INJECTION INTRAVENOUS at 09:08

## 2023-08-17 RX ADMIN — CARVEDILOL 6.25 MG: 6.25 TABLET, FILM COATED ORAL at 16:49

## 2023-08-17 RX ADMIN — PANTOPRAZOLE SODIUM 40 MG: 40 TABLET, DELAYED RELEASE ORAL at 09:04

## 2023-08-17 RX ADMIN — CARVEDILOL 6.25 MG: 6.25 TABLET, FILM COATED ORAL at 09:05

## 2023-08-17 RX ADMIN — LOSARTAN POTASSIUM 50 MG: 50 TABLET, FILM COATED ORAL at 14:38

## 2023-08-17 RX ADMIN — PANCRELIPASE 6000 UNITS: 30000; 6000; 19000 CAPSULE, DELAYED RELEASE PELLETS ORAL at 14:38

## 2023-08-17 RX ADMIN — LOSARTAN POTASSIUM 50 MG: 50 TABLET, FILM COATED ORAL at 09:06

## 2023-08-17 RX ADMIN — ATORVASTATIN CALCIUM 40 MG: 40 TABLET, FILM COATED ORAL at 09:04

## 2023-08-17 RX ADMIN — ASPIRIN 81 MG: 81 TABLET, CHEWABLE ORAL at 09:06

## 2023-08-17 RX ADMIN — PANCRELIPASE 6000 UNITS: 30000; 6000; 19000 CAPSULE, DELAYED RELEASE PELLETS ORAL at 09:04

## 2023-08-17 ASSESSMENT — ENCOUNTER SYMPTOMS
BACK PAIN: 0
SHORTNESS OF BREATH: 0
ABDOMINAL PAIN: 0
VOMITING: 0
DIARRHEA: 0
COUGH: 0
NAUSEA: 0

## 2023-08-17 ASSESSMENT — PAIN SCALES - GENERAL
PAINLEVEL_OUTOF10: 0

## 2023-08-17 NOTE — PLAN OF CARE
Discharging to [address] [place] [address] at [phone] 62. Patient is on fall risk precautions. Wheels on the bed are locked, call light within reach, bed alarm is on. Assessed for contributors to confusion, placed on fall precautions and provided hourly rounding. Patient denies pain at this time. Problem: Discharge Planning  Goal: Discharge to home or other facility with appropriate resources  8/17/2023 0445 by Mary Kay Escudero RN  Outcome: Progressing  Flowsheets (Taken 8/17/2023 0445)  Discharge to home or other facility with appropriate resources:   Identify barriers to discharge with patient and caregiver   Arrange for needed discharge resources and transportation as appropriate   Identify discharge learning needs (meds, wound care, etc)     Problem: Safety - Adult  Goal: Free from fall injury  8/17/2023 0445 by Mary Kay Escudero RN  Outcome: Progressing  Flowsheets (Taken 8/17/2023 0445)  Free From Fall Injury: Instruct family/caregiver on patient safety     Problem: Confusion  Goal: Confusion, delirium, dementia, or psychosis is improved or at baseline  Description: INTERVENTIONS:  1. Assess for possible contributors to thought disturbance, including medications, impaired vision or hearing, underlying metabolic abnormalities, dehydration, psychiatric diagnoses, and notify attending LIP  2. [place] high risk fall precautions, as indicated  3. Provide frequent short contacts to provide reality reorientation, refocusing and direction  4. Decrease environmental stimuli, including noise as appropriate  5. Monitor and intervene to maintain adequate nutrition, hydration, elimination, sleep and activity  6. If unable to ensure safety without constant attention obtain sitter and review sitter guidelines with assigned personnel  7.  Initiate Psychosocial CNS and Spiritual Care consult, as indicated  8/17/2023 0445 by Mary Kay Escudero RN  Outcome: Progressing  Flowsheets (Taken 8/17/2023 0445)  Effect of thought disturbance

## 2023-08-17 NOTE — CARE COORDINATION
Care Facility (LTC): [facility]  Phone: [phone]  Fax: [phone]    LOC at discharge: [address] Completed: No    Notification completed in HENS/PAS?:  Not Applicable    IMM Completed:   No         Transportation:  Transportation PLAN for discharge: EMS transportation   Mode of Transport: Ambulance stretcher - BLS  Reason for medical transport: Bed confined: Meets the following criteria 1) unable to get out of bed without assistance or ambulate, 2) unable to safely sit up in a wheelchair, 3) unable to maintain erect seating position in a chair for time needed for transport  Name of Transport Company: [employer]: [phone]  Time of Transport: 630pm    Transport form completed: Yes    Referrals made at [facility] for outpatient continued care:  Not Applicable    Additional CM Notes:       Pt is from LTC at [facility] and will DC back there. SW spoke to admissions and confirmed DC back. [facility] scheduled transport at 6:30pm via ilia       RN to call report: [phone] ext [phone]    COVID Result:    Lab Results   Component Value Date/Time    COVID19 Not Detected 08/14/2023 02:08 AM       The Plan for Transition of Care is related to the following treatment goals of ESRD (end stage renal disease) ([address]) [N18.6]    The Patient and/or patient representative Burnside Aminata and her family were provided with a choice of provider and agrees with the discharge plan Yes    Freedom of choice list was provided with basic dialogue that supports the patient's individualized plan of care/goals and shares the quality data associated with the providers.  Yes    Care Transitions patient: No    YULISSA Mei, [facility]  Case Management Department  Ph: [phone]

## 2023-08-17 NOTE — PROGRESS NOTES
Clinical Pharmacy Progress Note    Warfarin - Management by Pharmacy    Consult Date(s): 8/14  Consulting Provider(s): Dr. Tita Miles / Plan  PE h/o - Warfarin  Goal INR: 2 - 3  Concurrent Anticoagulants / Antiplatelets: aspirin 81 mg  Interactions: No significant interactions noted. Current Regimen / Plan:   Pt takes warfarin 2.5 mg once daily at home   INR today is 3.06 and supratherapeutic, will hold current 2.5 mg daily dose and reassess tomorrow    If discharged today recommend to hold warfarin today and resume 2.5 mg daily dose tomorrow  Pt will be discharged to [address] [place] [address], recommend to check INR no later than Monday 8/21 at facility   Will monitor pt's clinical status and INR daily, and make dose adjustments as needed. Thank you for consulting pharmacy,    Lupe Ward, PharmD  PGY1 Resident   Ext. [phone]  8/17/2023 9:12 AM        Interval update: No acute overnight events     Subjective/Objective:   Pratima Campos is a 78 y.o. female with a PMHx significant for ESRD w/ MWF HD, CHF, T2DM on insuling s/p BLE amputation, PE, Stroke, HTN, and COPD who is admitted for SOB. Pharmacy is consulted to dose warfarin.     Ht Readings from Last 1 Encounters:   08/17/23 4' 4\" (1.321 m)     Wt Readings from Last 1 Encounters:   08/17/23 105 lb 2.6 oz (47.7 kg)     Prior / Home Warfarin Regimen:  2.5 mg once daily     Date INR Warfarin   8/14 2.4 2.5   8/15 2.74 2.5   8/16 2.84 2.5   8/17 3.06 Hold        Recent Labs     08/15/23  0515 08/16/23  0428 08/17/23  0551   INR 2.74* 2.84* 3.06*   HGB 10.5* 9.7* 10.4*    189 219   LABALBU 3.8  --   --    CREATININE 3.6* 4.4* 3.3*

## 2023-08-17 NOTE — PROGRESS NOTES
Assessment & Plan:      Fluid overload improved. BP better, 139'N systolic. Off all oxygen supplementation, appears comfortable on room air. Continue dialysis per regular schedule MWF. Indio White for d/c from Nephrology standpoint, further management per primary team.  Continue to monitor closely. Monitor daily weight, I/O's; follow labs. Renally dose of medications, avoid nephrotoxins. Thank you for allowing us to participate in this patient's care     In case of any question please call us at our 24 hour answering service [phone] or from 7 AM to 5 PM via [employer] or cell number     Myra Georges MD  [facility] Nephrology  [address] [facility], [facility] [address]  Fax: [phone]  Office: [phone]     Subjective:     CC: SOB. HPI:  Prema Chapin is a 78 y.o. with PMH ESRD on HD (MWF; R forearm fistula), s/p B/L AKA, CHF, CAD, CVA, T2DM who presented with SOB from nursing home. She endorsed having SOB since the morning of admission. In the ED, patient required 3L NC to maintain saturations over 90% Labs in the ED showed K5.4 (hemolyzed), mild acidosis with chloride of 96 and chronic creatinine elevation to 5.0.  CBC showed chronic anemia. Troponin was elevated to 1.5.  proBNP was around 40,000. VBG showed pH of 7.329, PCO2 53.3 with PO2 51.6. CXR with evidence of cardiac enlargement, perihilar and bibasilar hazy attenuation favoring CHF. There were also bilateral pleural effusions noted. Patient receives hemodialysis on MWF, through R forearm fistula. She endorses no missed dialysis sessions, stating that her last dialysis has been done on Friday. She follows with nephrology at [address] [place] [address]. Of note, she has had previous admissions in 2022 for volume overload necessitating emergent HD. She has been seen by Dr. Bhanu Celaya in the past ( Nephrology). Interval History  Resting comfortable, eating well this am.   Off O2 and not complaining of any SOB.    She is very alert compared to

## 2023-08-17 NOTE — PROGRESS NOTES
Report attempted x2 to [address]. No answer. AVS and DERRELL completed and printed. Copy printed for granddaughter at bedside and updated that this RN unable to get in contact with [address] to give report, verbalized understanding.

## 2023-08-17 NOTE — PLAN OF CARE
Problem: Discharge Planning  Goal: Discharge to home or other facility with appropriate resources  8/17/2023 0445 by Bambi Mendes RN  Outcome: Progressing  Flowsheets (Taken 8/17/2023 0445)  Discharge to home or other facility with appropriate resources:   Identify barriers to discharge with patient and caregiver   Arrange for needed discharge resources and transportation as appropriate   Identify discharge learning needs (meds, wound care, etc)     Problem: Safety - Adult  Goal: Free from fall injury  8/17/2023 0445 by Bambi Mendes RN  Outcome: Progressing  Flowsheets (Taken 8/17/2023 0445)  Free From Fall Injury: Instruct family/caregiver on patient safety     Problem: Confusion  Goal: Confusion, delirium, dementia, or psychosis is improved or at baseline  Description: INTERVENTIONS:  1. Assess for possible contributors to thought disturbance, including medications, impaired vision or hearing, underlying metabolic abnormalities, dehydration, psychiatric diagnoses, and notify attending LIP  2. [place] high risk fall precautions, as indicated  3. Provide frequent short contacts to provide reality reorientation, refocusing and direction  4. Decrease environmental stimuli, including noise as appropriate  5. Monitor and intervene to maintain adequate nutrition, hydration, elimination, sleep and activity  6. If unable to ensure safety without constant attention obtain sitter and review sitter guidelines with assigned personnel  7.  Initiate Psychosocial CNS and Spiritual Care consult, as indicated  8/17/2023 0445 by Bambi Mendes RN  Outcome: Progressing  Flowsheets (Taken 8/17/2023 0445)  Effect of thought disturbance (confusion, delirium, dementia, or psychosis) are managed with adequate functional status:   Assess for contributors to thought disturbance, including medications, impaired vision or hearing, underlying metabolic abnormalities, dehydration, psychiatric diagnoses, notify [address]

## 2023-08-18 LAB
C PEPTIDE SERPL-MCNC: 0.9 NG/ML (ref 1.1–4.4)
C PEPTIDE SERPL-MCNC: 1.2 NG/ML (ref 1.1–4.4)

## 2023-08-18 NOTE — DISCHARGE SUMMARY
persist. We also communicated to the long term care facility that she will need supervision while eating to ensure she acquires enough calories throughout the day. The following issues were addressed during hospitalization:  1) Hypervolemia - Patient had three consecutive days of dialysis with her respiratory requirements improving to room air at discharge  2) Hypertension - Patient was started on Coreg and Losartan. Physical Exam  Cardiovascular:      Rate and Rhythm: Normal rate and regular rhythm. Pulses: Normal pulses. Heart sounds: Normal heart sounds. Pulmonary:      Effort: Pulmonary effort is normal.      Breath sounds: Rales present. Abdominal:      General: Abdomen is flat. Palpations: Abdomen is soft. Musculoskeletal:      Comments: B/L BKA     Neurological:      Mental Status: She is alert and oriented to person, place, and time. Consults: Nephrology  Significant Diagnostic Studies:  Echo  Treatments: Losartan, Metoprolol, HD  Disposition: [address]  Discharged Condition: Stable  Follow Up: Primary Care Physician in one week    DISCHARGE MEDICATION:       Medication List        START taking these medications      carvedilol 6.25 MG tablet  Commonly known as: COREG  Take 1 tablet by mouth 2 times daily (with meals)     Dexcom [address] [facility], [place]  Any Dexcom  works so long as it is compatible with the sensor     Dexcom G7 Sensor Misc  Any glucose sensor works     losartan 100 MG tablet  Commonly known as: COZAAR  Take 1 tablet by mouth daily Hold for SBP less than 100 mm of Hg and notify the provider.             CONTINUE taking these medications      acetaminophen 325 MG tablet  Commonly known as: TYLENOL     amLODIPine 10 MG tablet  Commonly known as: NORVASC     Artificial Tears 0.2-0.2-1 % Soln opthalmic solution  Generic drug: glycerin-hypromellose-     atorvastatin 40 MG tablet  Commonly known as: LIPITOR     Cholecalciferol 625 MCG (60092 UT)

## 2023-08-19 LAB — C PEPTIDE SERPL-MCNC: 1.3 NG/ML (ref 1.1–4.4)

## 2024-01-31 ENCOUNTER — APPOINTMENT (OUTPATIENT)
Dept: GENERAL RADIOLOGY | Age: 79
DRG: 853 | End: 2024-01-31
Payer: MEDICARE

## 2024-01-31 ENCOUNTER — HOSPITAL ENCOUNTER (INPATIENT)
Age: 79
LOS: 6 days | Discharge: HOSPICE | DRG: 853 | End: 2024-02-06
Attending: EMERGENCY MEDICINE | Admitting: FAMILY MEDICINE
Payer: MEDICARE

## 2024-01-31 DIAGNOSIS — J18.9 PNEUMONIA DUE TO INFECTIOUS ORGANISM, UNSPECIFIED LATERALITY, UNSPECIFIED PART OF LUNG: Primary | ICD-10-CM

## 2024-01-31 PROBLEM — A41.9 SEPSIS (HCC): Status: ACTIVE | Noted: 2024-01-31

## 2024-01-31 LAB
ALBUMIN SERPL-MCNC: 3.2 G/DL (ref 3.4–5)
ALP SERPL-CCNC: 96 U/L (ref 40–129)
ALT SERPL-CCNC: 6 U/L (ref 10–40)
ANION GAP SERPL CALCULATED.3IONS-SCNC: 16 MMOL/L (ref 3–16)
AST SERPL-CCNC: 14 U/L (ref 15–37)
BASE EXCESS BLDV CALC-SCNC: -0.6 MMOL/L (ref -2–3)
BASOPHILS # BLD: 0.1 K/UL (ref 0–0.2)
BASOPHILS NFR BLD: 0.5 %
BILIRUB DIRECT SERPL-MCNC: 0.3 MG/DL (ref 0–0.3)
BILIRUB INDIRECT SERPL-MCNC: 0.4 MG/DL (ref 0–1)
BILIRUB SERPL-MCNC: 0.7 MG/DL (ref 0–1)
BUN SERPL-MCNC: 61 MG/DL (ref 7–20)
CALCIUM SERPL-MCNC: 9.5 MG/DL (ref 8.3–10.6)
CHLORIDE SERPL-SCNC: 97 MMOL/L (ref 99–110)
CO2 BLDV-SCNC: 25 MMOL/L
CO2 SERPL-SCNC: 22 MMOL/L (ref 21–32)
COHGB MFR BLDV: 1.2 % (ref 0–1.5)
CREAT SERPL-MCNC: 4.7 MG/DL (ref 0.6–1.2)
DEPRECATED RDW RBC AUTO: 17.8 % (ref 12.4–15.4)
DO-HGB MFR BLDV: 9.9 %
EOSINOPHIL # BLD: 0 K/UL (ref 0–0.6)
EOSINOPHIL NFR BLD: 0.2 %
FLUAV RNA RESP QL NAA+PROBE: NOT DETECTED
FLUBV RNA RESP QL NAA+PROBE: NOT DETECTED
GFR SERPLBLD CREATININE-BSD FMLA CKD-EPI: 9 ML/MIN/{1.73_M2}
GLUCOSE SERPL-MCNC: 189 MG/DL (ref 70–99)
HCO3 BLDV-SCNC: 24 MMOL/L (ref 24–28)
HCT VFR BLD AUTO: 28.6 % (ref 36–48)
HGB BLD-MCNC: 9.5 G/DL (ref 12–16)
LACTATE BLDV-SCNC: 0.9 MMOL/L (ref 0.4–1.9)
LYMPHOCYTES # BLD: 0.8 K/UL (ref 1–5.1)
LYMPHOCYTES NFR BLD: 5.1 %
MCH RBC QN AUTO: 32.8 PG (ref 26–34)
MCHC RBC AUTO-ENTMCNC: 33.2 G/DL (ref 31–36)
MCV RBC AUTO: 98.7 FL (ref 80–100)
METHGB MFR BLDV: 0.1 % (ref 0–1.5)
MONOCYTES # BLD: 1.3 K/UL (ref 0–1.3)
MONOCYTES NFR BLD: 8.2 %
NEUTROPHILS # BLD: 13.9 K/UL (ref 1.7–7.7)
NEUTROPHILS NFR BLD: 86 %
PCO2 BLDV: 38.4 MMHG (ref 41–51)
PH BLDV: 7.41 [PH] (ref 7.35–7.45)
PLATELET # BLD AUTO: 208 K/UL (ref 135–450)
PMV BLD AUTO: 10 FL (ref 5–10.5)
PO2 BLDV: 58.6 MMHG (ref 25–40)
POTASSIUM SERPL-SCNC: 4.2 MMOL/L (ref 3.5–5.1)
PROT SERPL-MCNC: 6.9 G/DL (ref 6.4–8.2)
RBC # BLD AUTO: 2.9 M/UL (ref 4–5.2)
SAO2 % BLDV: 90 %
SARS-COV-2 RNA RESP QL NAA+PROBE: NOT DETECTED
SODIUM SERPL-SCNC: 135 MMOL/L (ref 136–145)
TROPONIN, HIGH SENSITIVITY: 148 NG/L (ref 0–14)
TROPONIN, HIGH SENSITIVITY: 151 NG/L (ref 0–14)
WBC # BLD AUTO: 16.2 K/UL (ref 4–11)

## 2024-01-31 PROCEDURE — 6360000002 HC RX W HCPCS: Performed by: FAMILY MEDICINE

## 2024-01-31 PROCEDURE — 85025 COMPLETE CBC W/AUTO DIFF WBC: CPT

## 2024-01-31 PROCEDURE — 2580000003 HC RX 258: Performed by: FAMILY MEDICINE

## 2024-01-31 PROCEDURE — 96367 TX/PROPH/DG ADDL SEQ IV INF: CPT

## 2024-01-31 PROCEDURE — 36415 COLL VENOUS BLD VENIPUNCTURE: CPT

## 2024-01-31 PROCEDURE — 99223 1ST HOSP IP/OBS HIGH 75: CPT | Performed by: FAMILY MEDICINE

## 2024-01-31 PROCEDURE — 2580000003 HC RX 258

## 2024-01-31 PROCEDURE — 87636 SARSCOV2 & INF A&B AMP PRB: CPT

## 2024-01-31 PROCEDURE — 87040 BLOOD CULTURE FOR BACTERIA: CPT

## 2024-01-31 PROCEDURE — 6360000002 HC RX W HCPCS

## 2024-01-31 PROCEDURE — 1200000000 HC SEMI PRIVATE

## 2024-01-31 PROCEDURE — 84484 ASSAY OF TROPONIN QUANT: CPT

## 2024-01-31 PROCEDURE — 83605 ASSAY OF LACTIC ACID: CPT

## 2024-01-31 PROCEDURE — 71045 X-RAY EXAM CHEST 1 VIEW: CPT

## 2024-01-31 PROCEDURE — 96365 THER/PROPH/DIAG IV INF INIT: CPT

## 2024-01-31 PROCEDURE — 99285 EMERGENCY DEPT VISIT HI MDM: CPT

## 2024-01-31 PROCEDURE — 80076 HEPATIC FUNCTION PANEL: CPT

## 2024-01-31 PROCEDURE — 80048 BASIC METABOLIC PNL TOTAL CA: CPT

## 2024-01-31 PROCEDURE — 82803 BLOOD GASES ANY COMBINATION: CPT

## 2024-01-31 RX ORDER — ACETAMINOPHEN 325 MG/1
650 TABLET ORAL EVERY 6 HOURS PRN
Status: DISCONTINUED | OUTPATIENT
Start: 2024-01-31 | End: 2024-02-06 | Stop reason: HOSPADM

## 2024-01-31 RX ORDER — MIRTAZAPINE 7.5 MG/1
7.5 TABLET, FILM COATED ORAL NIGHTLY
Status: ON HOLD | COMMUNITY
End: 2024-02-07 | Stop reason: HOSPADM

## 2024-01-31 RX ORDER — SODIUM CHLORIDE 0.9 % (FLUSH) 0.9 %
5-40 SYRINGE (ML) INJECTION PRN
Status: DISCONTINUED | OUTPATIENT
Start: 2024-01-31 | End: 2024-02-06 | Stop reason: HOSPADM

## 2024-01-31 RX ORDER — ACETAMINOPHEN 650 MG/1
650 SUPPOSITORY RECTAL EVERY 6 HOURS PRN
Status: DISCONTINUED | OUTPATIENT
Start: 2024-01-31 | End: 2024-02-06 | Stop reason: HOSPADM

## 2024-01-31 RX ORDER — SODIUM CHLORIDE 0.9 % (FLUSH) 0.9 %
5-40 SYRINGE (ML) INJECTION EVERY 12 HOURS SCHEDULED
Status: DISCONTINUED | OUTPATIENT
Start: 2024-01-31 | End: 2024-02-06 | Stop reason: HOSPADM

## 2024-01-31 RX ORDER — CARVEDILOL 12.5 MG/1
12.5 TABLET ORAL 2 TIMES DAILY
Status: ON HOLD | COMMUNITY
End: 2024-02-07 | Stop reason: HOSPADM

## 2024-01-31 RX ORDER — CARVEDILOL 25 MG/1
25 TABLET ORAL 2 TIMES DAILY
Status: ON HOLD | COMMUNITY
End: 2024-02-07 | Stop reason: HOSPADM

## 2024-01-31 RX ORDER — SODIUM CHLORIDE 9 MG/ML
INJECTION, SOLUTION INTRAVENOUS PRN
Status: DISCONTINUED | OUTPATIENT
Start: 2024-01-31 | End: 2024-02-06 | Stop reason: HOSPADM

## 2024-01-31 RX ORDER — POLYETHYLENE GLYCOL 3350 17 G/17G
17 POWDER, FOR SOLUTION ORAL DAILY PRN
Status: DISCONTINUED | OUTPATIENT
Start: 2024-01-31 | End: 2024-02-06 | Stop reason: HOSPADM

## 2024-01-31 RX ORDER — SODIUM CHLORIDE 9 MG/ML
INJECTION, SOLUTION INTRAVENOUS ONCE
Status: COMPLETED | OUTPATIENT
Start: 2024-01-31 | End: 2024-02-01

## 2024-01-31 RX ORDER — WARFARIN SODIUM 1 MG/1
0.5 TABLET ORAL NIGHTLY
Status: ON HOLD | COMMUNITY
End: 2024-02-07 | Stop reason: HOSPADM

## 2024-01-31 RX ORDER — 0.9 % SODIUM CHLORIDE 0.9 %
500 INTRAVENOUS SOLUTION INTRAVENOUS ONCE
Status: COMPLETED | OUTPATIENT
Start: 2024-01-31 | End: 2024-01-31

## 2024-01-31 RX ORDER — WARFARIN SODIUM 4 MG/1
4 TABLET ORAL NIGHTLY
Status: ON HOLD | COMMUNITY
End: 2024-02-07 | Stop reason: HOSPADM

## 2024-01-31 RX ORDER — LOSARTAN POTASSIUM 100 MG/1
100 TABLET ORAL NIGHTLY
Status: ON HOLD | COMMUNITY
End: 2024-02-07 | Stop reason: HOSPADM

## 2024-01-31 RX ADMIN — VANCOMYCIN HYDROCHLORIDE 1000 MG: 10 INJECTION, POWDER, LYOPHILIZED, FOR SOLUTION INTRAVENOUS at 21:31

## 2024-01-31 RX ADMIN — CEFEPIME 1000 MG: 1 INJECTION, POWDER, FOR SOLUTION INTRAMUSCULAR; INTRAVENOUS at 20:24

## 2024-01-31 RX ADMIN — SODIUM CHLORIDE: 9 INJECTION, SOLUTION INTRAVENOUS at 23:22

## 2024-01-31 RX ADMIN — SODIUM CHLORIDE, PRESERVATIVE FREE 10 ML: 5 INJECTION INTRAVENOUS at 23:23

## 2024-01-31 RX ADMIN — CEFEPIME 1000 MG: 1 INJECTION, POWDER, FOR SOLUTION INTRAMUSCULAR; INTRAVENOUS at 23:26

## 2024-01-31 RX ADMIN — SODIUM CHLORIDE 500 ML: 9 INJECTION, SOLUTION INTRAVENOUS at 20:21

## 2024-01-31 ASSESSMENT — LIFESTYLE VARIABLES: HOW OFTEN DO YOU HAVE A DRINK CONTAINING ALCOHOL: NEVER

## 2024-01-31 ASSESSMENT — PAIN DESCRIPTION - LOCATION: LOCATION: BACK

## 2024-01-31 ASSESSMENT — PAIN - FUNCTIONAL ASSESSMENT: PAIN_FUNCTIONAL_ASSESSMENT: 0-10

## 2024-01-31 ASSESSMENT — PAIN SCALES - GENERAL
PAINLEVEL_OUTOF10: 0
PAINLEVEL_OUTOF10: 8

## 2024-01-31 ASSESSMENT — PAIN DESCRIPTION - DESCRIPTORS: DESCRIPTORS: ACHING

## 2024-02-01 ENCOUNTER — APPOINTMENT (OUTPATIENT)
Dept: CT IMAGING | Age: 79
DRG: 853 | End: 2024-02-01
Payer: MEDICARE

## 2024-02-01 LAB
25(OH)D3 SERPL-MCNC: 63 NG/ML
ALBUMIN SERPL-MCNC: 3.3 G/DL (ref 3.4–5)
ALBUMIN/GLOB SERPL: 0.9 {RATIO} (ref 1.1–2.2)
ALP SERPL-CCNC: 108 U/L (ref 40–129)
ALT SERPL-CCNC: 9 U/L (ref 10–40)
ANION GAP SERPL CALCULATED.3IONS-SCNC: 18 MMOL/L (ref 3–16)
AST SERPL-CCNC: 13 U/L (ref 15–37)
BASOPHILS # BLD: 0 K/UL (ref 0–0.2)
BASOPHILS NFR BLD: 0.2 %
BILIRUB SERPL-MCNC: 0.6 MG/DL (ref 0–1)
BUN SERPL-MCNC: 63 MG/DL (ref 7–20)
CALCIUM SERPL-MCNC: 9.6 MG/DL (ref 8.3–10.6)
CHLORIDE SERPL-SCNC: 100 MMOL/L (ref 99–110)
CO2 SERPL-SCNC: 19 MMOL/L (ref 21–32)
CREAT SERPL-MCNC: 5.1 MG/DL (ref 0.6–1.2)
DEPRECATED RDW RBC AUTO: 18.1 % (ref 12.4–15.4)
EOSINOPHIL # BLD: 0 K/UL (ref 0–0.6)
EOSINOPHIL NFR BLD: 0.1 %
FERRITIN SERPL IA-MCNC: 3329 NG/ML (ref 15–150)
GFR SERPLBLD CREATININE-BSD FMLA CKD-EPI: 8 ML/MIN/{1.73_M2}
GLUCOSE BLD-MCNC: 174 MG/DL (ref 70–99)
GLUCOSE BLD-MCNC: 180 MG/DL (ref 70–99)
GLUCOSE BLD-MCNC: 181 MG/DL (ref 70–99)
GLUCOSE BLD-MCNC: 190 MG/DL (ref 70–99)
GLUCOSE SERPL-MCNC: 185 MG/DL (ref 70–99)
HCT VFR BLD AUTO: 28.2 % (ref 36–48)
HGB BLD-MCNC: 8.9 G/DL (ref 12–16)
INR PPP: 5.12 (ref 0.84–1.16)
IRON SATN MFR SERPL: 19 % (ref 15–50)
IRON SERPL-MCNC: 16 UG/DL (ref 37–145)
LACTATE BLDV-SCNC: 0.8 MMOL/L (ref 0.4–1.9)
LYMPHOCYTES # BLD: 0.5 K/UL (ref 1–5.1)
LYMPHOCYTES NFR BLD: 2.7 %
MCH RBC QN AUTO: 31.9 PG (ref 26–34)
MCHC RBC AUTO-ENTMCNC: 31.5 G/DL (ref 31–36)
MCV RBC AUTO: 101.2 FL (ref 80–100)
MONOCYTES # BLD: 1.2 K/UL (ref 0–1.3)
MONOCYTES NFR BLD: 6.2 %
MRSA DNA SPEC QL NAA+PROBE: NORMAL
NEUTROPHILS # BLD: 18 K/UL (ref 1.7–7.7)
NEUTROPHILS NFR BLD: 90.8 %
PERFORMED ON: ABNORMAL
PHOSPHATE SERPL-MCNC: 3.2 MG/DL (ref 2.5–4.9)
PLATELET # BLD AUTO: 226 K/UL (ref 135–450)
PMV BLD AUTO: 10.2 FL (ref 5–10.5)
POTASSIUM SERPL-SCNC: 4.3 MMOL/L (ref 3.5–5.1)
PROCALCITONIN SERPL IA-MCNC: 11.82 NG/ML (ref 0–0.15)
PROT SERPL-MCNC: 7 G/DL (ref 6.4–8.2)
PROTHROMBIN TIME: 46.8 SEC (ref 11.5–14.8)
PTH-INTACT SERPL-MCNC: 127.7 PG/ML (ref 14–72)
RBC # BLD AUTO: 2.79 M/UL (ref 4–5.2)
SODIUM SERPL-SCNC: 137 MMOL/L (ref 136–145)
TIBC SERPL-MCNC: 83 UG/DL (ref 260–445)
VANCOMYCIN SERPL-MCNC: 14.3 UG/ML
WBC # BLD AUTO: 19.8 K/UL (ref 4–11)

## 2024-02-01 PROCEDURE — 6360000002 HC RX W HCPCS: Performed by: FAMILY MEDICINE

## 2024-02-01 PROCEDURE — 97530 THERAPEUTIC ACTIVITIES: CPT

## 2024-02-01 PROCEDURE — 1200000000 HC SEMI PRIVATE

## 2024-02-01 PROCEDURE — 85025 COMPLETE CBC W/AUTO DIFF WBC: CPT

## 2024-02-01 PROCEDURE — 71250 CT THORAX DX C-: CPT

## 2024-02-01 PROCEDURE — 84145 PROCALCITONIN (PCT): CPT

## 2024-02-01 PROCEDURE — 87641 MR-STAPH DNA AMP PROBE: CPT

## 2024-02-01 PROCEDURE — 83540 ASSAY OF IRON: CPT

## 2024-02-01 PROCEDURE — 80053 COMPREHEN METABOLIC PANEL: CPT

## 2024-02-01 PROCEDURE — 85610 PROTHROMBIN TIME: CPT

## 2024-02-01 PROCEDURE — 93005 ELECTROCARDIOGRAM TRACING: CPT | Performed by: FAMILY MEDICINE

## 2024-02-01 PROCEDURE — 82728 ASSAY OF FERRITIN: CPT

## 2024-02-01 PROCEDURE — 97535 SELF CARE MNGMENT TRAINING: CPT

## 2024-02-01 PROCEDURE — 83550 IRON BINDING TEST: CPT

## 2024-02-01 PROCEDURE — 87040 BLOOD CULTURE FOR BACTERIA: CPT

## 2024-02-01 PROCEDURE — 2580000003 HC RX 258: Performed by: FAMILY MEDICINE

## 2024-02-01 PROCEDURE — 80202 ASSAY OF VANCOMYCIN: CPT

## 2024-02-01 PROCEDURE — 97166 OT EVAL MOD COMPLEX 45 MIN: CPT

## 2024-02-01 PROCEDURE — 82306 VITAMIN D 25 HYDROXY: CPT

## 2024-02-01 PROCEDURE — 84100 ASSAY OF PHOSPHORUS: CPT

## 2024-02-01 PROCEDURE — 83970 ASSAY OF PARATHORMONE: CPT

## 2024-02-01 PROCEDURE — 97162 PT EVAL MOD COMPLEX 30 MIN: CPT

## 2024-02-01 PROCEDURE — 83605 ASSAY OF LACTIC ACID: CPT

## 2024-02-01 PROCEDURE — 6370000000 HC RX 637 (ALT 250 FOR IP): Performed by: FAMILY MEDICINE

## 2024-02-01 PROCEDURE — 36415 COLL VENOUS BLD VENIPUNCTURE: CPT

## 2024-02-01 RX ORDER — AMLODIPINE BESYLATE 10 MG/1
10 TABLET ORAL
Status: DISCONTINUED | OUTPATIENT
Start: 2024-02-01 | End: 2024-02-02

## 2024-02-01 RX ORDER — PANTOPRAZOLE SODIUM 40 MG/1
40 TABLET, DELAYED RELEASE ORAL 2 TIMES DAILY
Status: DISCONTINUED | OUTPATIENT
Start: 2024-02-01 | End: 2024-02-06 | Stop reason: HOSPADM

## 2024-02-01 RX ORDER — ATORVASTATIN CALCIUM 40 MG/1
40 TABLET, FILM COATED ORAL DAILY
Status: DISCONTINUED | OUTPATIENT
Start: 2024-02-01 | End: 2024-02-06

## 2024-02-01 RX ORDER — INSULIN LISPRO 100 [IU]/ML
0-4 INJECTION, SOLUTION INTRAVENOUS; SUBCUTANEOUS NIGHTLY
Status: DISCONTINUED | OUTPATIENT
Start: 2024-02-01 | End: 2024-02-06 | Stop reason: HOSPADM

## 2024-02-01 RX ORDER — LOSARTAN POTASSIUM 50 MG/1
100 TABLET ORAL NIGHTLY
Status: DISCONTINUED | OUTPATIENT
Start: 2024-02-01 | End: 2024-02-02

## 2024-02-01 RX ORDER — INSULIN LISPRO 100 [IU]/ML
0-4 INJECTION, SOLUTION INTRAVENOUS; SUBCUTANEOUS
Status: DISCONTINUED | OUTPATIENT
Start: 2024-02-01 | End: 2024-02-06 | Stop reason: HOSPADM

## 2024-02-01 RX ORDER — CARVEDILOL 12.5 MG/1
12.5 TABLET ORAL 2 TIMES DAILY
Status: DISCONTINUED | OUTPATIENT
Start: 2024-02-01 | End: 2024-02-02

## 2024-02-01 RX ADMIN — SODIUM CHLORIDE, PRESERVATIVE FREE 10 ML: 5 INJECTION INTRAVENOUS at 20:46

## 2024-02-01 RX ADMIN — ACETAMINOPHEN 650 MG: 325 TABLET ORAL at 20:46

## 2024-02-01 RX ADMIN — ACETAMINOPHEN 650 MG: 325 TABLET ORAL at 09:05

## 2024-02-01 RX ADMIN — SODIUM CHLORIDE: 9 INJECTION, SOLUTION INTRAVENOUS at 22:12

## 2024-02-01 RX ADMIN — PANTOPRAZOLE SODIUM 40 MG: 40 TABLET, DELAYED RELEASE ORAL at 20:46

## 2024-02-01 RX ADMIN — AMLODIPINE BESYLATE 10 MG: 10 TABLET ORAL at 05:49

## 2024-02-01 RX ADMIN — PANTOPRAZOLE SODIUM 40 MG: 40 TABLET, DELAYED RELEASE ORAL at 09:00

## 2024-02-01 RX ADMIN — CEFEPIME 1000 MG: 1 INJECTION, POWDER, FOR SOLUTION INTRAMUSCULAR; INTRAVENOUS at 22:14

## 2024-02-01 RX ADMIN — CARVEDILOL 12.5 MG: 12.5 TABLET, FILM COATED ORAL at 09:00

## 2024-02-01 RX ADMIN — ATORVASTATIN CALCIUM 40 MG: 40 TABLET, FILM COATED ORAL at 09:00

## 2024-02-01 ASSESSMENT — PAIN DESCRIPTION - LOCATION
LOCATION: HIP;COCCYX
LOCATION: BACK

## 2024-02-01 ASSESSMENT — PAIN SCALES - WONG BAKER
WONGBAKER_NUMERICALRESPONSE: 0

## 2024-02-01 ASSESSMENT — PAIN SCALES - GENERAL
PAINLEVEL_OUTOF10: 2
PAINLEVEL_OUTOF10: 5
PAINLEVEL_OUTOF10: 0
PAINLEVEL_OUTOF10: 0

## 2024-02-01 ASSESSMENT — PAIN DESCRIPTION - DESCRIPTORS: DESCRIPTORS: ACHING;DISCOMFORT

## 2024-02-01 ASSESSMENT — PAIN DESCRIPTION - ORIENTATION: ORIENTATION: MID

## 2024-02-01 ASSESSMENT — PAIN - FUNCTIONAL ASSESSMENT: PAIN_FUNCTIONAL_ASSESSMENT: ACTIVITIES ARE NOT PREVENTED

## 2024-02-01 NOTE — H&P
Hospital Medicine History & Physical      Date of Admission: 1/31/2024    Date of Service:  Pt seen/examined on 1/31/2024     [x]Admitted to Inpatient with expected LOS greater than two midnights due to medical therapy.  []Placed in Observation status.    Chief Admission Complaint:  fever    Presenting Admission History:      78 y.o. female who presented to [facility] with sepsis.  PMHx significant for ESRD on HD CHF DM2 s/p bilateral LE amputations, PE on anticoagulation, CVA, HTN CAD.      Presents from facility for fever, 102F.  Per patient she felt hot.  Denies chills, cough/congestion, dysuria, abd pain, chest pain/pressure, nausea/vomiting.  Missed dialysis because she was not herself, \"not feeling well.\"    In the ER  Temp 100.2  WBC 16.2    Hgb 9.5    Na 135  Glucose 189  Cr 4.7/BUN 61    Troponin 151 repeat 148 (previous 149 8/14/2023)    CXR shows Small effusions with bilateral airspace opacity favored represent mild pulmonary edema. Superimposed pneumonia is not excluded.     Assessment/Plan:      Current Principal Problem:  Sepsis (HCC)    Sepsis/fever  - unknown source  - Vancomycin  - cefepime  - pro calcitonin  - blood cultures  - ct chest (possible pneumonia)  - monitor lactic acid  - 500 cc IVF @ 75/hr  - did use sepsis bolus because BP stable and ESRD on HD    ESRD on HD  - nephrology    DM2  - SSI    Cont home coreg Norvasc with parameters    Discussed management of the case in detail w/ the Emergency Department Provider:     CXR: I have reviewed the CXR with the following interpretation: Small effusions with bilateral airspace opacity favored represent mild pulmonary edema. Superimposed pneumonia is not excluded.   EKG:  I have reviewed the EKG with the following interpretation: completed in Er but not released in EMR      Physical Exam Performed:      BP (!) 136/45   Pulse 71   Temp 99.7 °F (37.6 °C) (Oral)   Resp 18   Wt 44.1 kg (97 lb 3.2 oz)   SpO2 97%   BMI 25.27 kg/m²     Physical

## 2024-02-01 NOTE — ED NOTES
ED TO INPATIENT SBAR HANDOFF    Patient Name: Avelina Ponce   :  1945  78 y.o.   MRN:  [id number]  Preferred Name  Avelina  ED Room #:  A06/A06-06  Family/Caregiver Present no   Restraints no   Sitter no   Sepsis Risk Score Sepsis Risk Score: 4.01    Situation  Code Status: Prior No additional code details.    Allergies: Patient has no known allergies.  Weight: Patient Vitals for the past 96 hrs (Last 3 readings):   Weight   24 1921 44.1 kg (97 lb 3.2 oz)     Arrived from: nursing home  Chief Complaint:   Chief Complaint   Patient presents with    Fever     Pt presents for fever from nursing facility. [facility] reports that she has not seemed herself for the past few days and expressed a temperature of 102 at the facility.     Hospital Problem/Diagnosis:  Active Problems:    * No active hospital problems. *  Resolved Problems:    * No resolved hospital problems. *    Imaging:   XR CHEST PORTABLE   Final Result   1. Small effusions with bilateral airspace opacity favored represent mild pulmonary edema. Superimposed pneumonia is not excluded.      Electronically signed by Eddie Pierre MD        Abnormal labs:   Abnormal Labs Reviewed   CBC WITH AUTO DIFFERENTIAL - Abnormal; Notable for the following components:       Result Value    WBC 16.2 (*)     RBC 2.90 (*)     Hemoglobin 9.5 (*)     Hematocrit 28.6 (*)     RDW 17.8 (*)     Neutrophils Absolute 13.9 (*)     Lymphocytes Absolute 0.8 (*)     All other components within normal limits   BASIC METABOLIC PANEL W/ REFLEX TO MG FOR LOW K - Abnormal; Notable for the following components:    Sodium 135 (*)     Chloride 97 (*)     Glucose 189 (*)     BUN 61 (*)     Creatinine 4.7 (*)     Est, Glom Filt Rate 9 (*)     All other components within normal limits   BLOOD GAS, VENOUS - Abnormal; Notable for the following components:    pCO2, Pravin 38.4 (*)     pO2, Pravin 58.6 (*)     All other components within normal limits   HEPATIC FUNCTION PANEL -

## 2024-02-01 NOTE — ED PROVIDER NOTES
ED Attending Attestation Note     Date of evaluation: 1/31/2024    This patient was seen by the resident.  I have seen and examined the patient, agree with the workup, evaluation, management and diagnosis. The care plan has been discussed.  I have reviewed the ECG and concur with the resident's interpretation.  My assessment reveals a chronically ill-appearing female who looks ill with a temperature of 100.2 sat of 95 pulse 64.  102 temperature at nursing facility.  She has bilateral BKA's.  She missed dialysis on Monday.  She is a Monday Wednesday Friday dialysis patient.  Workup was obtained including blood cultures x-rays blood work blood cultures. she was started on IV fluids however not to 30 mL/kg due to missing dialysis and being fluid over loaded.  After cultures obtained antibiotics ordered.     Gerald Leo MD  01/31/24 1941    
by mouth nightly       Allergies     She has No Known Allergies.    Physical Exam     INITIAL VITALS: BP: (!) 134/41, Temp: 100.2 °F (37.9 °C), Pulse: 64, Respirations: 16, SpO2: 95 %     Physical Exam  Constitutional:       Appearance: Normal appearance. She is normal weight. She is ill-appearing.   HENT:      Head: Normocephalic.      Nose: Nose normal.      Mouth/Throat:      Mouth: Mucous membranes are moist.      Pharynx: Oropharynx is clear.   Eyes:      Extraocular Movements: Extraocular movements intact.      Pupils: Pupils are equal, round, and reactive to light.   Neck:      Comments: Patient has tenderness to the left side of neck, no stiffness noted.  Cardiovascular:      Rate and Rhythm: Normal rate.      Pulses: Normal pulses.   Pulmonary:      Effort: Pulmonary effort is normal.      Comments: Decreased breath sounds b/l  Chest:      Chest wall: No tenderness.   Abdominal:      General: Abdomen is flat. There is no distension.      Tenderness: There is no abdominal tenderness.   Musculoskeletal:         General: No swelling, deformity or signs of injury. Normal range of motion.      Cervical back: Normal range of motion. Tenderness present.      Comments: History of b/l BKA   Skin:     General: Skin is warm and dry.      Capillary Refill: Capillary refill takes less than 2 seconds.   Neurological:      General: No focal deficit present.      Mental Status: She is alert and oriented to person, place, and time.   Psychiatric:         Mood and Affect: Mood normal.           Betsey Becerra DPM  Resident  01/31/24 [id number]

## 2024-02-02 LAB
ALBUMIN SERPL-MCNC: 3 G/DL (ref 3.4–5)
ALBUMIN/GLOB SERPL: 0.8 {RATIO} (ref 1.1–2.2)
ALP SERPL-CCNC: 178 U/L (ref 40–129)
ALT SERPL-CCNC: 6 U/L (ref 10–40)
ANION GAP SERPL CALCULATED.3IONS-SCNC: 16 MMOL/L (ref 3–16)
AST SERPL-CCNC: 14 U/L (ref 15–37)
BASOPHILS # BLD: 0 K/UL (ref 0–0.2)
BASOPHILS NFR BLD: 0.1 %
BILIRUB SERPL-MCNC: 0.5 MG/DL (ref 0–1)
BUN SERPL-MCNC: 73 MG/DL (ref 7–20)
CALCIUM SERPL-MCNC: 9.4 MG/DL (ref 8.3–10.6)
CHLORIDE SERPL-SCNC: 102 MMOL/L (ref 99–110)
CO2 SERPL-SCNC: 20 MMOL/L (ref 21–32)
CREAT SERPL-MCNC: 5.8 MG/DL (ref 0.6–1.2)
DEPRECATED RDW RBC AUTO: 17.4 % (ref 12.4–15.4)
EKG ATRIAL RATE: 68 BPM
EKG DIAGNOSIS: NORMAL
EKG P AXIS: 52 DEGREES
EKG P-R INTERVAL: 170 MS
EKG Q-T INTERVAL: 432 MS
EKG QRS DURATION: 134 MS
EKG QTC CALCULATION (BAZETT): 459 MS
EKG R AXIS: -35 DEGREES
EKG T AXIS: 44 DEGREES
EKG VENTRICULAR RATE: 68 BPM
EOSINOPHIL # BLD: 0.2 K/UL (ref 0–0.6)
EOSINOPHIL NFR BLD: 1.1 %
GFR SERPLBLD CREATININE-BSD FMLA CKD-EPI: 7 ML/MIN/{1.73_M2}
GLUCOSE BLD-MCNC: 107 MG/DL (ref 70–99)
GLUCOSE BLD-MCNC: 109 MG/DL (ref 70–99)
GLUCOSE BLD-MCNC: 114 MG/DL (ref 70–99)
GLUCOSE BLD-MCNC: 161 MG/DL (ref 70–99)
GLUCOSE BLD-MCNC: 97 MG/DL (ref 70–99)
GLUCOSE SERPL-MCNC: 173 MG/DL (ref 70–99)
HCT VFR BLD AUTO: 26.7 % (ref 36–48)
HGB BLD-MCNC: 8.6 G/DL (ref 12–16)
INR PPP: 7.18 (ref 0.84–1.16)
LYMPHOCYTES # BLD: 1.1 K/UL (ref 1–5.1)
LYMPHOCYTES NFR BLD: 5.7 %
MCH RBC QN AUTO: 32.4 PG (ref 26–34)
MCHC RBC AUTO-ENTMCNC: 32 G/DL (ref 31–36)
MCV RBC AUTO: 101.3 FL (ref 80–100)
MONOCYTES # BLD: 1.2 K/UL (ref 0–1.3)
MONOCYTES NFR BLD: 6.4 %
NEUTROPHILS # BLD: 16.2 K/UL (ref 1.7–7.7)
NEUTROPHILS NFR BLD: 86.7 %
PERFORMED ON: ABNORMAL
PERFORMED ON: NORMAL
PLATELET # BLD AUTO: 220 K/UL (ref 135–450)
PMV BLD AUTO: 10.1 FL (ref 5–10.5)
POTASSIUM SERPL-SCNC: 4.5 MMOL/L (ref 3.5–5.1)
PROCALCITONIN SERPL IA-MCNC: 10.14 NG/ML (ref 0–0.15)
PROT SERPL-MCNC: 6.6 G/DL (ref 6.4–8.2)
PROTHROMBIN TIME: 60.9 SEC (ref 11.5–14.8)
RBC # BLD AUTO: 2.64 M/UL (ref 4–5.2)
SODIUM SERPL-SCNC: 138 MMOL/L (ref 136–145)
VANCOMYCIN SERPL-MCNC: 11.6 UG/ML
WBC # BLD AUTO: 18.7 K/UL (ref 4–11)

## 2024-02-02 PROCEDURE — 80202 ASSAY OF VANCOMYCIN: CPT

## 2024-02-02 PROCEDURE — 90935 HEMODIALYSIS ONE EVALUATION: CPT

## 2024-02-02 PROCEDURE — 6370000000 HC RX 637 (ALT 250 FOR IP): Performed by: FAMILY MEDICINE

## 2024-02-02 PROCEDURE — 94760 N-INVAS EAR/PLS OXIMETRY 1: CPT

## 2024-02-02 PROCEDURE — 84145 PROCALCITONIN (PCT): CPT

## 2024-02-02 PROCEDURE — 1200000000 HC SEMI PRIVATE

## 2024-02-02 PROCEDURE — 80053 COMPREHEN METABOLIC PANEL: CPT

## 2024-02-02 PROCEDURE — 85610 PROTHROMBIN TIME: CPT

## 2024-02-02 PROCEDURE — 85025 COMPLETE CBC W/AUTO DIFF WBC: CPT

## 2024-02-02 PROCEDURE — 6370000000 HC RX 637 (ALT 250 FOR IP): Performed by: INTERNAL MEDICINE

## 2024-02-02 PROCEDURE — 6360000002 HC RX W HCPCS: Performed by: INTERNAL MEDICINE

## 2024-02-02 PROCEDURE — 93010 ELECTROCARDIOGRAM REPORT: CPT | Performed by: INTERNAL MEDICINE

## 2024-02-02 PROCEDURE — 2580000003 HC RX 258: Performed by: FAMILY MEDICINE

## 2024-02-02 PROCEDURE — 36415 COLL VENOUS BLD VENIPUNCTURE: CPT

## 2024-02-02 PROCEDURE — 5A1D70Z PERFORMANCE OF URINARY FILTRATION, INTERMITTENT, LESS THAN 6 HOURS PER DAY: ICD-10-PCS | Performed by: FAMILY MEDICINE

## 2024-02-02 PROCEDURE — 99222 1ST HOSP IP/OBS MODERATE 55: CPT | Performed by: SURGERY

## 2024-02-02 RX ORDER — SODIUM HYPOCHLORITE 1.25 MG/ML
SOLUTION TOPICAL DAILY
Status: DISCONTINUED | OUTPATIENT
Start: 2024-02-02 | End: 2024-02-06 | Stop reason: HOSPADM

## 2024-02-02 RX ORDER — 0.9 % SODIUM CHLORIDE 0.9 %
100 INTRAVENOUS SOLUTION INTRAVENOUS PRN
Status: DISCONTINUED | OUTPATIENT
Start: 2024-02-02 | End: 2024-02-06 | Stop reason: HOSPADM

## 2024-02-02 RX ORDER — LOSARTAN POTASSIUM 50 MG/1
50 TABLET ORAL NIGHTLY
Status: DISCONTINUED | OUTPATIENT
Start: 2024-02-02 | End: 2024-02-03

## 2024-02-02 RX ORDER — LEVOFLOXACIN 500 MG/1
500 TABLET, FILM COATED ORAL EVERY OTHER DAY
Status: DISCONTINUED | OUTPATIENT
Start: 2024-02-02 | End: 2024-02-03

## 2024-02-02 RX ADMIN — PANTOPRAZOLE SODIUM 40 MG: 40 TABLET, DELAYED RELEASE ORAL at 08:05

## 2024-02-02 RX ADMIN — ATORVASTATIN CALCIUM 40 MG: 40 TABLET, FILM COATED ORAL at 08:05

## 2024-02-02 RX ADMIN — LEVOFLOXACIN 500 MG: 500 TABLET, FILM COATED ORAL at 15:27

## 2024-02-02 RX ADMIN — PANTOPRAZOLE SODIUM 40 MG: 40 TABLET, DELAYED RELEASE ORAL at 20:34

## 2024-02-02 RX ADMIN — LOSARTAN POTASSIUM 50 MG: 50 TABLET, FILM COATED ORAL at 20:34

## 2024-02-02 RX ADMIN — EPOETIN ALFA-EPBX 10000 UNITS: 10000 INJECTION, SOLUTION INTRAVENOUS; SUBCUTANEOUS at 16:02

## 2024-02-02 RX ADMIN — ACETAMINOPHEN 650 MG: 325 TABLET ORAL at 15:27

## 2024-02-02 RX ADMIN — SODIUM CHLORIDE, PRESERVATIVE FREE 10 ML: 5 INJECTION INTRAVENOUS at 08:08

## 2024-02-02 ASSESSMENT — PAIN SCALES - WONG BAKER
WONGBAKER_NUMERICALRESPONSE: 0

## 2024-02-02 ASSESSMENT — PAIN DESCRIPTION - DESCRIPTORS: DESCRIPTORS: SORE

## 2024-02-02 ASSESSMENT — PAIN SCALES - GENERAL: PAINLEVEL_OUTOF10: 3

## 2024-02-02 ASSESSMENT — PAIN DESCRIPTION - ORIENTATION: ORIENTATION: UPPER

## 2024-02-02 ASSESSMENT — PAIN - FUNCTIONAL ASSESSMENT: PAIN_FUNCTIONAL_ASSESSMENT: PREVENTS OR INTERFERES WITH ALL ACTIVE AND SOME PASSIVE ACTIVITIES

## 2024-02-02 ASSESSMENT — PAIN DESCRIPTION - LOCATION: LOCATION: BUTTOCKS

## 2024-02-02 NOTE — PLAN OF CARE
Problem: Skin/Tissue Integrity  Goal: Absence of new skin breakdown  Description: 1.  Monitor for areas of redness and/or skin breakdown  2.  Assess vascular access sites hourly  3.  Every 4-6 hours minimum:  Change oxygen saturation probe site  4.  Every 4-6 hours:  If on nasal continuous positive airway pressure, respiratory therapy assess nares and determine need for appliance change or resting period.  Outcome: Progressing  Note: With stage 3 pressure injury on coccyx foam dressing applied and wound care nurse referral done. Keep patient dry and clean all the time and position change every 2 hours     Problem: Safety - Adult  Goal: Free from fall injury  Outcome: Progressing  Flowsheets (Taken 2/1/2024 2243)  Free From Fall Injury: Based on caregiver fall risk screen, instruct family/caregiver to ask for assistance with transferring infant if caregiver noted to have fall risk factors  Note: Patient remain free from fall at this time. Ensure all safety measures are in place, bed lock in lowest position, bed alarm on, bedside table with patient's belongings and water and call light within patient reach.     Problem: Pain  Goal: Verbalizes/displays adequate comfort level or baseline comfort level  Outcome: Progressing  Flowsheets (Taken 2/1/2024 2243)  Verbalizes/displays adequate comfort level or baseline comfort level:   Encourage patient to monitor pain and request assistance   Assess pain using appropriate pain scale   Administer analgesics based on type and severity of pain and evaluate response  Note: Patient have back and sacrum pain as per verbalization, PRN pain medication given see MAR.      Problem: Discharge Planning  Goal: Discharge to home or other facility with appropriate resources  Outcome: Progressing

## 2024-02-02 NOTE — PLAN OF CARE
Problem: Discharge Planning  Goal: Discharge to home or other facility with appropriate resources  Outcome: Not Progressing  Note: Pt remains lethargic, on antibiotics      Problem: Skin/Tissue Integrity  Goal: Absence of new skin breakdown  Description: 1.  Monitor for areas of redness and/or skin breakdown  2.  Assess vascular access sites hourly  3.  Every 4-6 hours minimum:  Change oxygen saturation probe site  4.  Every 4-6 hours:  If on nasal continuous positive airway pressure, respiratory therapy assess nares and determine need for appliance change or resting period.  Outcome: Progressing  Note: Pt remains free from further skin breakdown. Wound care nurse consulted. Pt transferred to specialty, skinguard bed. Pt turned Q2H with pillow support.      Problem: Safety - Adult  Goal: Free from fall injury  Outcome: Progressing  Flowsheets (Taken 2/2/2024 1311)  Free From Fall Injury: Instruct family/caregiver on patient safety  Note: Pt remains free from falls. Fall precautions in place. Bed locked and in lowest position. Bed alarm on. Bedside table and call light within reach.      Problem: Pain  Goal: Verbalizes/displays adequate comfort level or baseline comfort level  Outcome: Progressing  Flowsheets (Taken 2/2/2024 1311)  Verbalizes/displays adequate comfort level or baseline comfort level:   Encourage patient to monitor pain and request assistance   Assess pain using appropriate pain scale   Administer analgesics based on type and severity of pain and evaluate response   Implement non-pharmacological measures as appropriate and evaluate response   Consider cultural and social influences on pain and pain management   Notify Licensed Independent Practitioner if interventions unsuccessful or patient reports new pain  Note: Pt states no pain when asked     Problem: Chronic Conditions and Co-morbidities  Goal: Patient's chronic conditions and co-morbidity symptoms are monitored and maintained or

## 2024-02-02 NOTE — CARE COORDINATION
Called [facility] to find out if patient was lTC. Patient is LTC with transport to HD from the nursing facility. [facility] is getting the location of patent's HD for CM to post in chart. Electronically signed by Brionna Beckman RN on 2/2/2024 at 11:35 AM      Addendum:   MWF goes to [facility] for HD. Electronically signed by Brionna Beckman RN on 2/2/2024 at 1:03 PM

## 2024-02-02 NOTE — CARE COORDINATION
Case Management Assessment  Initial Evaluation    Date/Time of Evaluation: 2/2/2024 6:13 PM  Assessment Completed by: Brionna Beckman RN    If patient is discharged prior to next notation, then this note serves as note for discharge by case management.    Patient Name: Avelina Ponce                   YOB: 1945  Diagnosis: Sepsis (HCC) [A41.9]  Pneumonia due to infectious organism, unspecified laterality, unspecified part of lung [J18.9]                   Date / Time: 1/31/2024  7:14 PM    Patient Admission Status: Inpatient   Readmission Risk (Low < 19, Mod (19-27), High > 27): Readmission Risk Score: 22.8    Current PCP: Nena Booker MD  PCP verified by CM? Yes    Chart Reviewed: Yes      History Provided by: Patient, Other (see comment) (facility)  Patient Orientation: Alert and Oriented, Person, Place    Patient Cognition: Alert    Hospitalization in the last 30 days (Readmission):  No    If yes, Readmission Assessment in  Navigator will be completed.    Advance Directives:      Code Status: Full Code   Patient's Primary Decision Maker is: Legal Next of Kin      Discharge Planning:    Patient lives with: Alone Type of Home: Long-Term Care  Primary Care Giver: Other (Comment) ([facility])  Patient Support Systems include: Family Members   Current Financial resources: Medicare  Current community resources:    Current services prior to admission: Extended Care Placement, Other (Comment) ([facility] can take back when discharged)            Current DME:              Type of Home Care services:  None    ADLS  Prior functional level: Assistance with the following:, Bathing, Dressing, Toileting, Feeding, Cooking, Housework, Shopping, Mobility, Other (see comment) (LTC)  Current functional level: Assistance with the following:, Mobility, Bathing, Dressing, Toileting, Feeding, Cooking, Housework, Shopping, Other (see comment) (LTC)    PT AM-PAC: 8 /24  OT AM-PAC: 12

## 2024-02-02 NOTE — DISCHARGE INSTR - COC
Clean;Dry;Intact 24 0343   Wound Cleansed Not Cleansed 24   Dressing/Treatment Moist to dry 24 08   Wound Assessment Purple/maroon 24 08   Drainage Amount Scant (moist but unmeasurable) 24 0800   Number of days: 1        Elimination:  Continence:   Bowel: {YES / NO:}  Bladder: {YES / NO:}  Urinary Catheter: {Urinary Catheter:729220675}   Colostomy/Ileostomy/Ileal Conduit: {YES / NO:}       Date of Last BM: ***    Intake/Output Summary (Last 24 hours) at 2024 1303  Last data filed at 2024 1020  Gross per 24 hour   Intake 430 ml   Output --   Net 430 ml     I/O last 3 completed shifts:  In: 934.1 [P.O.:620; I.V.:10; IV Piggyback:304.1]  Out: -     Safety Concerns:     { DERRELL Safety Concerns:686366974}    Impairments/Disabilities:      { DERRELL Impairments/Disabilities:219290783}    Nutrition Therapy:  Current Nutrition Therapy:   { DERRELL Diet List:827078353}    Routes of Feeding: {[facility] Other Feedings:651698116}  Liquids: {Slp liquid thickness:52713}  Daily Fluid Restriction: {[facility] DME Yes amt example:378431112}  Last Modified Barium Swallow with Video (Video Swallowing Test): {Done Not Done Date:}    Treatments at the Time of Hospital Discharge:   Respiratory Treatments: ***  Oxygen Therapy:  {Therapy; copd oxygen:37223}  Ventilator:    { CC Vent List:796838416}    Rehab Therapies: {THERAPEUTIC INTERVENTION:6324799995}  Weight Bearing Status/Restrictions: {[facility] Weight Bearin}  Other Medical Equipment (for information only, NOT a DME order):  {EQUIPMENT:211252379}  Other Treatments: ***    Patient's personal belongings (please select all that are sent with patient):  {[facility] DME Belongings:168545507}    RN SIGNATURE:  {Esignature:320977373}    CASE MANAGEMENT/SOCIAL WORK SECTION    Inpatient Status Date: ***    Readmission Risk Assessment Score:  Readmission Risk              Risk of Unplanned Readmission:  22           Discharging to

## 2024-02-03 LAB
ALBUMIN SERPL-MCNC: 3.3 G/DL (ref 3.4–5)
ALP SERPL-CCNC: 118 U/L (ref 40–129)
ALT SERPL-CCNC: 6 U/L (ref 10–40)
ANION GAP SERPL CALCULATED.3IONS-SCNC: 14 MMOL/L (ref 3–16)
AST SERPL-CCNC: 14 U/L (ref 15–37)
BASOPHILS # BLD: 0 K/UL (ref 0–0.2)
BASOPHILS NFR BLD: 0.1 %
BILIRUB DIRECT SERPL-MCNC: 0.3 MG/DL (ref 0–0.3)
BILIRUB INDIRECT SERPL-MCNC: 0.3 MG/DL (ref 0–1)
BILIRUB SERPL-MCNC: 0.6 MG/DL (ref 0–1)
BUN SERPL-MCNC: 27 MG/DL (ref 7–20)
CALCIUM SERPL-MCNC: 9.5 MG/DL (ref 8.3–10.6)
CHLORIDE SERPL-SCNC: 95 MMOL/L (ref 99–110)
CO2 SERPL-SCNC: 26 MMOL/L (ref 21–32)
CREAT SERPL-MCNC: 3 MG/DL (ref 0.6–1.2)
DEPRECATED RDW RBC AUTO: 16.6 % (ref 12.4–15.4)
EOSINOPHIL # BLD: 0.2 K/UL (ref 0–0.6)
EOSINOPHIL NFR BLD: 0.7 %
GFR SERPLBLD CREATININE-BSD FMLA CKD-EPI: 15 ML/MIN/{1.73_M2}
GLUCOSE BLD-MCNC: 120 MG/DL (ref 70–99)
GLUCOSE BLD-MCNC: 175 MG/DL (ref 70–99)
GLUCOSE BLD-MCNC: 176 MG/DL (ref 70–99)
GLUCOSE BLD-MCNC: 202 MG/DL (ref 70–99)
GLUCOSE SERPL-MCNC: 108 MG/DL (ref 70–99)
HCT VFR BLD AUTO: 28 % (ref 36–48)
HGB BLD-MCNC: 9 G/DL (ref 12–16)
INR PPP: 5.31 (ref 0.84–1.16)
LYMPHOCYTES # BLD: 0.6 K/UL (ref 1–5.1)
LYMPHOCYTES NFR BLD: 2.9 %
MAGNESIUM SERPL-MCNC: 1.6 MG/DL (ref 1.8–2.4)
MCH RBC QN AUTO: 32 PG (ref 26–34)
MCHC RBC AUTO-ENTMCNC: 32.2 G/DL (ref 31–36)
MCV RBC AUTO: 99.4 FL (ref 80–100)
MONOCYTES # BLD: 1 K/UL (ref 0–1.3)
MONOCYTES NFR BLD: 4.7 %
NEUTROPHILS # BLD: 20.2 K/UL (ref 1.7–7.7)
NEUTROPHILS NFR BLD: 91.6 %
PERFORMED ON: ABNORMAL
PHOSPHATE SERPL-MCNC: 2 MG/DL (ref 2.5–4.9)
PLATELET # BLD AUTO: 249 K/UL (ref 135–450)
PMV BLD AUTO: 9.8 FL (ref 5–10.5)
POTASSIUM SERPL-SCNC: 4.1 MMOL/L (ref 3.5–5.1)
PROT SERPL-MCNC: 7.1 G/DL (ref 6.4–8.2)
PROTHROMBIN TIME: 48.2 SEC (ref 11.5–14.8)
RBC # BLD AUTO: 2.82 M/UL (ref 4–5.2)
SODIUM SERPL-SCNC: 135 MMOL/L (ref 136–145)
WBC # BLD AUTO: 22.1 K/UL (ref 4–11)

## 2024-02-03 PROCEDURE — 6370000000 HC RX 637 (ALT 250 FOR IP): Performed by: INTERNAL MEDICINE

## 2024-02-03 PROCEDURE — 2500000003 HC RX 250 WO HCPCS

## 2024-02-03 PROCEDURE — 2580000003 HC RX 258: Performed by: INTERNAL MEDICINE

## 2024-02-03 PROCEDURE — 6360000002 HC RX W HCPCS: Performed by: INTERNAL MEDICINE

## 2024-02-03 PROCEDURE — 80069 RENAL FUNCTION PANEL: CPT

## 2024-02-03 PROCEDURE — 80076 HEPATIC FUNCTION PANEL: CPT

## 2024-02-03 PROCEDURE — 83735 ASSAY OF MAGNESIUM: CPT

## 2024-02-03 PROCEDURE — 1200000000 HC SEMI PRIVATE

## 2024-02-03 PROCEDURE — 2580000003 HC RX 258: Performed by: FAMILY MEDICINE

## 2024-02-03 PROCEDURE — 85025 COMPLETE CBC W/AUTO DIFF WBC: CPT

## 2024-02-03 PROCEDURE — 36415 COLL VENOUS BLD VENIPUNCTURE: CPT

## 2024-02-03 PROCEDURE — 6360000002 HC RX W HCPCS

## 2024-02-03 PROCEDURE — 85610 PROTHROMBIN TIME: CPT

## 2024-02-03 PROCEDURE — 6370000000 HC RX 637 (ALT 250 FOR IP): Performed by: FAMILY MEDICINE

## 2024-02-03 PROCEDURE — 2580000003 HC RX 258

## 2024-02-03 RX ORDER — MAGNESIUM SULFATE IN WATER 40 MG/ML
2000 INJECTION, SOLUTION INTRAVENOUS ONCE
Status: COMPLETED | OUTPATIENT
Start: 2024-02-03 | End: 2024-02-03

## 2024-02-03 RX ORDER — LOSARTAN POTASSIUM 25 MG/1
25 TABLET ORAL NIGHTLY
Status: DISCONTINUED | OUTPATIENT
Start: 2024-02-03 | End: 2024-02-05

## 2024-02-03 RX ADMIN — ACETAMINOPHEN 650 MG: 325 TABLET ORAL at 08:54

## 2024-02-03 RX ADMIN — PANTOPRAZOLE SODIUM 40 MG: 40 TABLET, DELAYED RELEASE ORAL at 08:48

## 2024-02-03 RX ADMIN — Medication: at 09:00

## 2024-02-03 RX ADMIN — ATORVASTATIN CALCIUM 40 MG: 40 TABLET, FILM COATED ORAL at 08:48

## 2024-02-03 RX ADMIN — SODIUM CHLORIDE, PRESERVATIVE FREE 10 ML: 5 INJECTION INTRAVENOUS at 20:53

## 2024-02-03 RX ADMIN — SODIUM PHOSPHATE, MONOBASIC, MONOHYDRATE AND SODIUM PHOSPHATE, DIBASIC, ANHYDROUS 10 MMOL: 142; 276 INJECTION, SOLUTION INTRAVENOUS at 13:29

## 2024-02-03 RX ADMIN — INSULIN LISPRO 1 UNITS: 100 INJECTION, SOLUTION INTRAVENOUS; SUBCUTANEOUS at 13:00

## 2024-02-03 RX ADMIN — PANTOPRAZOLE SODIUM 40 MG: 40 TABLET, DELAYED RELEASE ORAL at 20:51

## 2024-02-03 RX ADMIN — LOSARTAN POTASSIUM 25 MG: 25 TABLET, FILM COATED ORAL at 20:51

## 2024-02-03 RX ADMIN — MAGNESIUM SULFATE HEPTAHYDRATE 2000 MG: 40 INJECTION, SOLUTION INTRAVENOUS at 11:20

## 2024-02-03 RX ADMIN — CEFEPIME 2000 MG: 2 INJECTION, POWDER, FOR SOLUTION INTRAVENOUS at 17:41

## 2024-02-03 RX ADMIN — SODIUM CHLORIDE, PRESERVATIVE FREE 10 ML: 5 INJECTION INTRAVENOUS at 08:58

## 2024-02-03 ASSESSMENT — PAIN DESCRIPTION - DESCRIPTORS: DESCRIPTORS: ACHING

## 2024-02-03 ASSESSMENT — PAIN - FUNCTIONAL ASSESSMENT: PAIN_FUNCTIONAL_ASSESSMENT: ACTIVITIES ARE NOT PREVENTED

## 2024-02-03 ASSESSMENT — PAIN DESCRIPTION - LOCATION: LOCATION: BUTTOCKS

## 2024-02-03 NOTE — PLAN OF CARE
Problem: Discharge Planning  Goal: Discharge to home or other facility with appropriate resources  Outcome: Progressing     Problem: Safety - Adult  Goal: Free from fall injury  Outcome: Progressing   Safety precaution in place, call light in reach will concha to monitor.  Problem: Pain  Goal: Verbalizes/displays adequate comfort level or baseline comfort level  Outcome: Progressing   Pain manage by PRN tylenol  Problem: Chronic Conditions and Co-morbidities  Goal: Patient's chronic conditions and co-morbidity symptoms are monitored and maintained or improved  Outcome: Progressing

## 2024-02-04 LAB
ABO + RH BLD: NORMAL
ALBUMIN SERPL-MCNC: 2.8 G/DL (ref 3.4–5)
ANION GAP SERPL CALCULATED.3IONS-SCNC: 15 MMOL/L (ref 3–16)
BACTERIA BLD CULT: NORMAL
BASOPHILS # BLD: 0.1 K/UL (ref 0–0.2)
BASOPHILS NFR BLD: 0.3 %
BLD GP AB SCN SERPL QL: NORMAL
BUN SERPL-MCNC: 38 MG/DL (ref 7–20)
CALCIUM SERPL-MCNC: 9.1 MG/DL (ref 8.3–10.6)
CHLORIDE SERPL-SCNC: 98 MMOL/L (ref 99–110)
CO2 SERPL-SCNC: 25 MMOL/L (ref 21–32)
CREAT SERPL-MCNC: 4.4 MG/DL (ref 0.6–1.2)
DEPRECATED RDW RBC AUTO: 16.7 % (ref 12.4–15.4)
EOSINOPHIL # BLD: 0.1 K/UL (ref 0–0.6)
EOSINOPHIL NFR BLD: 0.4 %
GFR SERPLBLD CREATININE-BSD FMLA CKD-EPI: 10 ML/MIN/{1.73_M2}
GLUCOSE BLD-MCNC: 185 MG/DL (ref 70–99)
GLUCOSE BLD-MCNC: 199 MG/DL (ref 70–99)
GLUCOSE BLD-MCNC: 237 MG/DL (ref 70–99)
GLUCOSE BLD-MCNC: 261 MG/DL (ref 70–99)
GLUCOSE SERPL-MCNC: 203 MG/DL (ref 70–99)
HCT VFR BLD AUTO: 26.7 % (ref 36–48)
HGB BLD-MCNC: 8.6 G/DL (ref 12–16)
INR PPP: 4.23 (ref 0.84–1.16)
LYMPHOCYTES # BLD: 0.7 K/UL (ref 1–5.1)
LYMPHOCYTES NFR BLD: 3.3 %
MAGNESIUM SERPL-MCNC: 2.1 MG/DL (ref 1.8–2.4)
MCH RBC QN AUTO: 32.1 PG (ref 26–34)
MCHC RBC AUTO-ENTMCNC: 32.3 G/DL (ref 31–36)
MCV RBC AUTO: 99.4 FL (ref 80–100)
MONOCYTES # BLD: 1.4 K/UL (ref 0–1.3)
MONOCYTES NFR BLD: 6 %
NEUTROPHILS # BLD: 20.4 K/UL (ref 1.7–7.7)
NEUTROPHILS NFR BLD: 90 %
PERFORMED ON: ABNORMAL
PHOSPHATE SERPL-MCNC: 3.3 MG/DL (ref 2.5–4.9)
PLATELET # BLD AUTO: 244 K/UL (ref 135–450)
PMV BLD AUTO: 9.6 FL (ref 5–10.5)
POTASSIUM SERPL-SCNC: 4.2 MMOL/L (ref 3.5–5.1)
PROTHROMBIN TIME: 40.4 SEC (ref 11.5–14.8)
RBC # BLD AUTO: 2.69 M/UL (ref 4–5.2)
SODIUM SERPL-SCNC: 138 MMOL/L (ref 136–145)
WBC # BLD AUTO: 22.7 K/UL (ref 4–11)

## 2024-02-04 PROCEDURE — 6370000000 HC RX 637 (ALT 250 FOR IP): Performed by: STUDENT IN AN ORGANIZED HEALTH CARE EDUCATION/TRAINING PROGRAM

## 2024-02-04 PROCEDURE — 86900 BLOOD TYPING SEROLOGIC ABO: CPT

## 2024-02-04 PROCEDURE — 6360000002 HC RX W HCPCS: Performed by: STUDENT IN AN ORGANIZED HEALTH CARE EDUCATION/TRAINING PROGRAM

## 2024-02-04 PROCEDURE — 85025 COMPLETE CBC W/AUTO DIFF WBC: CPT

## 2024-02-04 PROCEDURE — 85610 PROTHROMBIN TIME: CPT

## 2024-02-04 PROCEDURE — 6360000002 HC RX W HCPCS: Performed by: INTERNAL MEDICINE

## 2024-02-04 PROCEDURE — 86850 RBC ANTIBODY SCREEN: CPT

## 2024-02-04 PROCEDURE — 6370000000 HC RX 637 (ALT 250 FOR IP): Performed by: FAMILY MEDICINE

## 2024-02-04 PROCEDURE — 2500000003 HC RX 250 WO HCPCS

## 2024-02-04 PROCEDURE — 2580000003 HC RX 258: Performed by: INTERNAL MEDICINE

## 2024-02-04 PROCEDURE — 36415 COLL VENOUS BLD VENIPUNCTURE: CPT

## 2024-02-04 PROCEDURE — 0JB70ZZ EXCISION OF BACK SUBCUTANEOUS TISSUE AND FASCIA, OPEN APPROACH: ICD-10-PCS

## 2024-02-04 PROCEDURE — 86922 COMPATIBILITY TEST ANTIGLOB: CPT

## 2024-02-04 PROCEDURE — 6370000000 HC RX 637 (ALT 250 FOR IP): Performed by: INTERNAL MEDICINE

## 2024-02-04 PROCEDURE — 83735 ASSAY OF MAGNESIUM: CPT

## 2024-02-04 PROCEDURE — 2580000003 HC RX 258: Performed by: FAMILY MEDICINE

## 2024-02-04 PROCEDURE — 1200000000 HC SEMI PRIVATE

## 2024-02-04 PROCEDURE — 86901 BLOOD TYPING SEROLOGIC RH(D): CPT

## 2024-02-04 PROCEDURE — 80069 RENAL FUNCTION PANEL: CPT

## 2024-02-04 RX ORDER — ERGOCALCIFEROL 1.25 MG/1
50000 CAPSULE ORAL WEEKLY
Status: DISCONTINUED | OUTPATIENT
Start: 2024-02-04 | End: 2024-02-06 | Stop reason: HOSPADM

## 2024-02-04 RX ORDER — LIDOCAINE HYDROCHLORIDE AND EPINEPHRINE 10; 10 MG/ML; UG/ML
20 INJECTION, SOLUTION INFILTRATION; PERINEURAL ONCE
Status: COMPLETED | OUTPATIENT
Start: 2024-02-04 | End: 2024-02-04

## 2024-02-04 RX ORDER — ARTIFICIAL TEARS 1; 2; 3 MG/ML; MG/ML; MG/ML
1 SOLUTION/ DROPS OPHTHALMIC 2 TIMES DAILY
Status: DISCONTINUED | OUTPATIENT
Start: 2024-02-04 | End: 2024-02-06 | Stop reason: HOSPADM

## 2024-02-04 RX ORDER — ONDANSETRON 2 MG/ML
4 INJECTION INTRAMUSCULAR; INTRAVENOUS EVERY 6 HOURS PRN
Status: DISCONTINUED | OUTPATIENT
Start: 2024-02-04 | End: 2024-02-06 | Stop reason: HOSPADM

## 2024-02-04 RX ORDER — GLUCAGON 1 MG/ML
1 KIT INJECTION PRN
Status: DISCONTINUED | OUTPATIENT
Start: 2024-02-04 | End: 2024-02-06 | Stop reason: HOSPADM

## 2024-02-04 RX ORDER — MIRTAZAPINE 15 MG/1
7.5 TABLET, FILM COATED ORAL NIGHTLY
Status: DISCONTINUED | OUTPATIENT
Start: 2024-02-04 | End: 2024-02-06 | Stop reason: HOSPADM

## 2024-02-04 RX ORDER — DEXTROSE MONOHYDRATE 100 MG/ML
INJECTION, SOLUTION INTRAVENOUS CONTINUOUS PRN
Status: DISCONTINUED | OUTPATIENT
Start: 2024-02-04 | End: 2024-02-06 | Stop reason: HOSPADM

## 2024-02-04 RX ORDER — TRAMADOL HYDROCHLORIDE 50 MG/1
50 TABLET ORAL EVERY 6 HOURS PRN
Status: DISCONTINUED | OUTPATIENT
Start: 2024-02-04 | End: 2024-02-06 | Stop reason: HOSPADM

## 2024-02-04 RX ADMIN — DEXTRAN 70, GLYCERIN, HYPROMELLOSE 1 DROP: 1; 2; 3 SOLUTION/ DROPS OPHTHALMIC at 11:08

## 2024-02-04 RX ADMIN — SODIUM CHLORIDE, PRESERVATIVE FREE 10 ML: 5 INJECTION INTRAVENOUS at 08:22

## 2024-02-04 RX ADMIN — INSULIN LISPRO 2 UNITS: 100 INJECTION, SOLUTION INTRAVENOUS; SUBCUTANEOUS at 16:48

## 2024-02-04 RX ADMIN — TRAMADOL HYDROCHLORIDE 50 MG: 50 TABLET, COATED ORAL at 12:36

## 2024-02-04 RX ADMIN — DEXTRAN 70, GLYCERIN, HYPROMELLOSE 1 DROP: 1; 2; 3 SOLUTION/ DROPS OPHTHALMIC at 19:55

## 2024-02-04 RX ADMIN — CEFEPIME 1000 MG: 1 INJECTION, POWDER, FOR SOLUTION INTRAMUSCULAR; INTRAVENOUS at 11:53

## 2024-02-04 RX ADMIN — LIDOCAINE HYDROCHLORIDE,EPINEPHRINE BITARTRATE 20 ML: 10; .01 INJECTION, SOLUTION INFILTRATION; PERINEURAL at 22:29

## 2024-02-04 RX ADMIN — Medication: at 11:26

## 2024-02-04 RX ADMIN — PANTOPRAZOLE SODIUM 40 MG: 40 TABLET, DELAYED RELEASE ORAL at 08:22

## 2024-02-04 RX ADMIN — SODIUM CHLORIDE, PRESERVATIVE FREE 10 ML: 5 INJECTION INTRAVENOUS at 19:55

## 2024-02-04 RX ADMIN — ATORVASTATIN CALCIUM 40 MG: 40 TABLET, FILM COATED ORAL at 08:22

## 2024-02-04 RX ADMIN — MIRTAZAPINE 7.5 MG: 15 TABLET, FILM COATED ORAL at 19:54

## 2024-02-04 RX ADMIN — PANTOPRAZOLE SODIUM 40 MG: 40 TABLET, DELAYED RELEASE ORAL at 19:54

## 2024-02-04 RX ADMIN — ERGOCALCIFEROL 50000 UNITS: 1.25 CAPSULE ORAL at 16:48

## 2024-02-04 RX ADMIN — ONDANSETRON 4 MG: 2 INJECTION INTRAMUSCULAR; INTRAVENOUS at 15:15

## 2024-02-04 RX ADMIN — TRAMADOL HYDROCHLORIDE 50 MG: 50 TABLET, COATED ORAL at 18:20

## 2024-02-04 ASSESSMENT — PAIN DESCRIPTION - PAIN TYPE
TYPE: ACUTE PAIN

## 2024-02-04 ASSESSMENT — PAIN DESCRIPTION - ONSET
ONSET: GRADUAL
ONSET: ON-GOING
ONSET: PROGRESSIVE

## 2024-02-04 ASSESSMENT — PAIN SCALES - GENERAL
PAINLEVEL_OUTOF10: 6
PAINLEVEL_OUTOF10: 0
PAINLEVEL_OUTOF10: 5
PAINLEVEL_OUTOF10: 0
PAINLEVEL_OUTOF10: 5
PAINLEVEL_OUTOF10: 3

## 2024-02-04 ASSESSMENT — PAIN SCALES - WONG BAKER
WONGBAKER_NUMERICALRESPONSE: 0

## 2024-02-04 ASSESSMENT — PAIN DESCRIPTION - LOCATION
LOCATION: ABDOMEN
LOCATION: COCCYX
LOCATION: COCCYX

## 2024-02-04 ASSESSMENT — PAIN DESCRIPTION - DESCRIPTORS
DESCRIPTORS: ACHING;DISCOMFORT
DESCRIPTORS: ACHING;DISCOMFORT
DESCRIPTORS: ACHING;CRAMPING;DISCOMFORT

## 2024-02-04 ASSESSMENT — PAIN DESCRIPTION - ORIENTATION
ORIENTATION: MID;INNER
ORIENTATION: MID
ORIENTATION: MID;INNER

## 2024-02-04 ASSESSMENT — PAIN DESCRIPTION - FREQUENCY
FREQUENCY: INTERMITTENT
FREQUENCY: CONTINUOUS
FREQUENCY: INTERMITTENT

## 2024-02-04 ASSESSMENT — PAIN - FUNCTIONAL ASSESSMENT
PAIN_FUNCTIONAL_ASSESSMENT: PREVENTS OR INTERFERES SOME ACTIVE ACTIVITIES AND ADLS
PAIN_FUNCTIONAL_ASSESSMENT: PREVENTS OR INTERFERES SOME ACTIVE ACTIVITIES AND ADLS

## 2024-02-04 NOTE — PLAN OF CARE
Problem: Nutrition Deficit:  Goal: Optimize nutritional status  Outcome: Not Progressing  Note: Patient has lost appetite.      Problem: Skin/Tissue Integrity  Goal: Absence of new skin breakdown  Outcome: Not Progressing  Note: See skin integumentary flowhsheet for skin assessment. Dressing change completed per orders.     Problem: Pain  Goal: Verbalizes/displays adequate comfort level or baseline comfort level  Outcome: Progressing  Note: Patient c/o buttock pain. PRN tramadol ordered and given per MAR.     Problem: Safety - Adult  Goal: Free from fall injury  Outcome: Progressing  Note: All safety precautions in place. CLWR.

## 2024-02-04 NOTE — PLAN OF CARE
No new skin issues noted. Patient remains free from falls and injury. Verbalizes no pain but grimaces during turns.  Problem: Skin/Tissue Integrity  Goal: Absence of new skin breakdown  Description: 1.  Monitor for areas of redness and/or skin breakdown  2.  Assess vascular access sites hourly  3.  Every 4-6 hours minimum:  Change oxygen saturation probe site  4.  Every 4-6 hours:  If on nasal continuous positive airway pressure, respiratory therapy assess nares and determine need for appliance change or resting period.  2/4/2024 0300 by Sherice Mckeon, RN  Outcome: Progressing     Problem: Safety - Adult  Goal: Free from fall injury  2/4/2024 0300 by Sherice Mckeon, RN  Outcome: Progressing     Problem: Pain  Goal: Verbalizes/displays adequate comfort level or baseline comfort level  2/4/2024 0300 by Sherice Mckeon, RN  Outcome: Progressing

## 2024-02-05 LAB
ALBUMIN SERPL-MCNC: 2.9 G/DL (ref 3.4–5)
ANION GAP SERPL CALCULATED.3IONS-SCNC: 15 MMOL/L (ref 3–16)
BACTERIA BLD CULT ORG #2: NORMAL
BACTERIA BLD CULT: NORMAL
BUN SERPL-MCNC: 50 MG/DL (ref 7–20)
CALCIUM SERPL-MCNC: 9 MG/DL (ref 8.3–10.6)
CHLORIDE SERPL-SCNC: 97 MMOL/L (ref 99–110)
CO2 SERPL-SCNC: 23 MMOL/L (ref 21–32)
CREAT SERPL-MCNC: 5 MG/DL (ref 0.6–1.2)
DEPRECATED RDW RBC AUTO: 16.3 % (ref 12.4–15.4)
EKG ATRIAL RATE: 66 BPM
EKG DIAGNOSIS: NORMAL
EKG P AXIS: 69 DEGREES
EKG P-R INTERVAL: 170 MS
EKG Q-T INTERVAL: 430 MS
EKG QRS DURATION: 134 MS
EKG QTC CALCULATION (BAZETT): 450 MS
EKG R AXIS: -41 DEGREES
EKG T AXIS: 78 DEGREES
EKG VENTRICULAR RATE: 66 BPM
GFR SERPLBLD CREATININE-BSD FMLA CKD-EPI: 8 ML/MIN/{1.73_M2}
GLUCOSE BLD-MCNC: 177 MG/DL (ref 70–99)
GLUCOSE BLD-MCNC: 178 MG/DL (ref 70–99)
GLUCOSE BLD-MCNC: 179 MG/DL (ref 70–99)
GLUCOSE BLD-MCNC: 239 MG/DL (ref 70–99)
GLUCOSE SERPL-MCNC: 265 MG/DL (ref 70–99)
HCT VFR BLD AUTO: 24.6 % (ref 36–48)
HGB BLD-MCNC: 8 G/DL (ref 12–16)
MCH RBC QN AUTO: 32.3 PG (ref 26–34)
MCHC RBC AUTO-ENTMCNC: 32.5 G/DL (ref 31–36)
MCV RBC AUTO: 99.6 FL (ref 80–100)
PERFORMED ON: ABNORMAL
PHOSPHATE SERPL-MCNC: 4 MG/DL (ref 2.5–4.9)
PLATELET # BLD AUTO: 244 K/UL (ref 135–450)
PMV BLD AUTO: 10 FL (ref 5–10.5)
POTASSIUM SERPL-SCNC: 4.7 MMOL/L (ref 3.5–5.1)
RBC # BLD AUTO: 2.47 M/UL (ref 4–5.2)
REASON FOR REJECTION: NORMAL
REJECTED TEST: NORMAL
SODIUM SERPL-SCNC: 135 MMOL/L (ref 136–145)
WBC # BLD AUTO: 24.6 K/UL (ref 4–11)

## 2024-02-05 PROCEDURE — 86706 HEP B SURFACE ANTIBODY: CPT

## 2024-02-05 PROCEDURE — 99221 1ST HOSP IP/OBS SF/LOW 40: CPT | Performed by: NURSE PRACTITIONER

## 2024-02-05 PROCEDURE — 6360000002 HC RX W HCPCS: Performed by: INTERNAL MEDICINE

## 2024-02-05 PROCEDURE — 2580000003 HC RX 258: Performed by: STUDENT IN AN ORGANIZED HEALTH CARE EDUCATION/TRAINING PROGRAM

## 2024-02-05 PROCEDURE — 85027 COMPLETE CBC AUTOMATED: CPT

## 2024-02-05 PROCEDURE — 2580000003 HC RX 258: Performed by: FAMILY MEDICINE

## 2024-02-05 PROCEDURE — 36415 COLL VENOUS BLD VENIPUNCTURE: CPT

## 2024-02-05 PROCEDURE — 6360000002 HC RX W HCPCS: Performed by: STUDENT IN AN ORGANIZED HEALTH CARE EDUCATION/TRAINING PROGRAM

## 2024-02-05 PROCEDURE — 80069 RENAL FUNCTION PANEL: CPT

## 2024-02-05 PROCEDURE — 80074 ACUTE HEPATITIS PANEL: CPT

## 2024-02-05 PROCEDURE — 1200000000 HC SEMI PRIVATE

## 2024-02-05 PROCEDURE — 90935 HEMODIALYSIS ONE EVALUATION: CPT

## 2024-02-05 RX ORDER — INSULIN GLARGINE 100 [IU]/ML
10 INJECTION, SOLUTION SUBCUTANEOUS DAILY
Status: DISCONTINUED | OUTPATIENT
Start: 2024-02-05 | End: 2024-02-06 | Stop reason: HOSPADM

## 2024-02-05 RX ORDER — MIDODRINE HYDROCHLORIDE 5 MG/1
10 TABLET ORAL
Status: DISCONTINUED | OUTPATIENT
Start: 2024-02-05 | End: 2024-02-06 | Stop reason: HOSPADM

## 2024-02-05 RX ADMIN — EPOETIN ALFA-EPBX 10000 UNITS: 10000 INJECTION, SOLUTION INTRAVENOUS; SUBCUTANEOUS at 10:39

## 2024-02-05 RX ADMIN — SODIUM CHLORIDE, PRESERVATIVE FREE 10 ML: 5 INJECTION INTRAVENOUS at 14:00

## 2024-02-05 RX ADMIN — PIPERACILLIN AND TAZOBACTAM 3375 MG: 3; .375 INJECTION, POWDER, FOR SOLUTION INTRAVENOUS; PARENTERAL at 14:35

## 2024-02-05 RX ADMIN — DEXTRAN 70, GLYCERIN, HYPROMELLOSE 1 DROP: 1; 2; 3 SOLUTION/ DROPS OPHTHALMIC at 13:05

## 2024-02-05 NOTE — PLAN OF CARE
Patient had ulcer to coccyx debrided this shift by Gen Surg MD. Patient remains free from falls and injury.  Problem: Skin/Tissue Integrity  Goal: Absence of new skin breakdown  Description: 1.  Monitor for areas of redness and/or skin breakdown  2.  Assess vascular access sites hourly  3.  Every 4-6 hours minimum:  Change oxygen saturation probe site  4.  Every 4-6 hours:  If on nasal continuous positive airway pressure, respiratory therapy assess nares and determine need for appliance change or resting period.  2/5/2024 0149 by Sherice Mckeon, RN  Outcome: Progressing    Problem: Safety - Adult  Goal: Free from fall injury  2/5/2024 0149 by Sherice Mckeon, RN  Outcome: Progressing

## 2024-02-05 NOTE — PROCEDURES
Excisional Debridement of Decubitus Ulcer    Indication for Procedure:  Patient with an unstageable decubitus ulcer 10 cm in length and 7 cm in width, requiring mechanical debridement of non-viable tissue to permit healing/recovery.     Pre-Procedure:   Written informed consent was provided by Neeta Curtis, daughter, after discussion of risks and benefits of the procedure, including the answering of all questions to the patient's stated satisfaction.     The image below was taken immediately prior to debridement:      Procedure:  Patient was identified by hospital-provided identification number, and wrist-band confirmation. Patient was rolled into the right lateral decubitus position for ideal exposure. The skin was prepped and draped in the usual sterile manner, 1% lidocaine with epinephrine was used to anesthetize the wound edges for increased pain control. An #11 blade scalpel was used to excise the non-viable tissue overlying the wound. After the excision of eschar/non-viable tissue, wound was found to be stage 4. Additional fibrotic tissue was excised down to and including presacral fascia until the wound bed was composed of viable tissue, evidenced by the presence of bleeding. Wound was 10 cm in length and 7 cm in width after debridement.     Pressure was applied to the area to ensure that blood loss was kept to a minimum. Kerlix gauze was then moistened with sterile saline and packed into the wound bed, followed by covering of the area with a Sacral Heart dressing.     Patient tolerated the procedure well.     Image below is immediately post-debridement:      Post-Procedure:  Patient to be kept in a flat supine position for 2 hours status post debridement to provide additional pressure for hemostasis. Please contact the surgery team should the patient have bleeding that does not stop soon after discovery. Patient will be followed to monitor progress. Dressing will be changed BID with Dakins-soaked Kerlix and

## 2024-02-06 ENCOUNTER — HOSPITAL ENCOUNTER (INPATIENT)
Age: 79
LOS: 2 days | Discharge: HOSPICE | DRG: 951 | End: 2024-02-08
Attending: INTERNAL MEDICINE | Admitting: INTERNAL MEDICINE
Payer: COMMERCIAL

## 2024-02-06 VITALS
OXYGEN SATURATION: 99 % | WEIGHT: 92.81 LBS | BODY MASS INDEX: 21.48 KG/M2 | DIASTOLIC BLOOD PRESSURE: 48 MMHG | TEMPERATURE: 99.6 F | RESPIRATION RATE: 14 BRPM | HEIGHT: 55 IN | HEART RATE: 71 BPM | SYSTOLIC BLOOD PRESSURE: 110 MMHG

## 2024-02-06 DIAGNOSIS — Z51.5 HOSPICE CARE: Primary | ICD-10-CM

## 2024-02-06 PROBLEM — L89.150 PRESSURE INJURY OF SACRAL REGION, UNSTAGEABLE (HCC): Status: ACTIVE | Noted: 2024-02-06

## 2024-02-06 LAB
ANION GAP SERPL CALCULATED.3IONS-SCNC: 16 MMOL/L (ref 3–16)
BUN SERPL-MCNC: 21 MG/DL (ref 7–20)
CALCIUM SERPL-MCNC: 9.2 MG/DL (ref 8.3–10.6)
CHLORIDE SERPL-SCNC: 98 MMOL/L (ref 99–110)
CO2 SERPL-SCNC: 23 MMOL/L (ref 21–32)
CREAT SERPL-MCNC: 2.7 MG/DL (ref 0.6–1.2)
DEPRECATED RDW RBC AUTO: 16.5 % (ref 12.4–15.4)
GFR SERPLBLD CREATININE-BSD FMLA CKD-EPI: 17 ML/MIN/{1.73_M2}
GLUCOSE BLD-MCNC: 166 MG/DL (ref 70–99)
GLUCOSE BLD-MCNC: 172 MG/DL (ref 70–99)
GLUCOSE BLD-MCNC: 188 MG/DL (ref 70–99)
GLUCOSE BLD-MCNC: 188 MG/DL (ref 70–99)
GLUCOSE SERPL-MCNC: 157 MG/DL (ref 70–99)
HAV IGM SERPL QL IA: NORMAL
HBV CORE IGM SERPL QL IA: NORMAL
HBV SURFACE AB SERPL IA-ACNC: <3.5 MIU/ML
HBV SURFACE AG SERPL QL IA: NORMAL
HCT VFR BLD AUTO: 26 % (ref 36–48)
HCV AB SERPL QL IA: NORMAL
HGB BLD-MCNC: 8.5 G/DL (ref 12–16)
INR PPP: 2.98 (ref 0.84–1.16)
MCH RBC QN AUTO: 32.1 PG (ref 26–34)
MCHC RBC AUTO-ENTMCNC: 32.9 G/DL (ref 31–36)
MCV RBC AUTO: 97.7 FL (ref 80–100)
PERFORMED ON: ABNORMAL
PLATELET # BLD AUTO: 270 K/UL (ref 135–450)
PMV BLD AUTO: 9.7 FL (ref 5–10.5)
POTASSIUM SERPL-SCNC: 4.1 MMOL/L (ref 3.5–5.1)
PROTHROMBIN TIME: 30.8 SEC (ref 11.5–14.8)
RBC # BLD AUTO: 2.66 M/UL (ref 4–5.2)
SODIUM SERPL-SCNC: 137 MMOL/L (ref 136–145)
WBC # BLD AUTO: 22.2 K/UL (ref 4–11)

## 2024-02-06 PROCEDURE — 85610 PROTHROMBIN TIME: CPT

## 2024-02-06 PROCEDURE — 36415 COLL VENOUS BLD VENIPUNCTURE: CPT

## 2024-02-06 PROCEDURE — 6360000002 HC RX W HCPCS: Performed by: STUDENT IN AN ORGANIZED HEALTH CARE EDUCATION/TRAINING PROGRAM

## 2024-02-06 PROCEDURE — 6370000000 HC RX 637 (ALT 250 FOR IP)

## 2024-02-06 PROCEDURE — 2500000003 HC RX 250 WO HCPCS

## 2024-02-06 PROCEDURE — 2580000003 HC RX 258: Performed by: STUDENT IN AN ORGANIZED HEALTH CARE EDUCATION/TRAINING PROGRAM

## 2024-02-06 PROCEDURE — 2580000003 HC RX 258: Performed by: FAMILY MEDICINE

## 2024-02-06 PROCEDURE — 87641 MR-STAPH DNA AMP PROBE: CPT

## 2024-02-06 PROCEDURE — 85027 COMPLETE CBC AUTOMATED: CPT

## 2024-02-06 PROCEDURE — 0JB70ZZ EXCISION OF BACK SUBCUTANEOUS TISSUE AND FASCIA, OPEN APPROACH: ICD-10-PCS

## 2024-02-06 PROCEDURE — 99222 1ST HOSP IP/OBS MODERATE 55: CPT | Performed by: INTERNAL MEDICINE

## 2024-02-06 PROCEDURE — 1250000000 HC SEMI PRIVATE HOSPICE R&B

## 2024-02-06 PROCEDURE — 80048 BASIC METABOLIC PNL TOTAL CA: CPT

## 2024-02-06 PROCEDURE — 6360000002 HC RX W HCPCS: Performed by: INTERNAL MEDICINE

## 2024-02-06 RX ORDER — ACETAMINOPHEN 325 MG/1
650 TABLET ORAL EVERY 6 HOURS PRN
Status: DISCONTINUED | OUTPATIENT
Start: 2024-02-06 | End: 2024-02-08 | Stop reason: HOSPADM

## 2024-02-06 RX ORDER — MORPHINE SULFATE 20 MG/ML
5 SOLUTION ORAL
Status: DISCONTINUED | OUTPATIENT
Start: 2024-02-06 | End: 2024-02-08 | Stop reason: HOSPADM

## 2024-02-06 RX ORDER — ARTIFICIAL TEARS 1; 2; 3 MG/ML; MG/ML; MG/ML
1 SOLUTION/ DROPS OPHTHALMIC 2 TIMES DAILY
Status: CANCELLED | OUTPATIENT
Start: 2024-02-06

## 2024-02-06 RX ORDER — HYDROMORPHONE HYDROCHLORIDE 1 MG/ML
0.5 INJECTION, SOLUTION INTRAMUSCULAR; INTRAVENOUS; SUBCUTANEOUS
Status: DISCONTINUED | OUTPATIENT
Start: 2024-02-06 | End: 2024-02-08 | Stop reason: HOSPADM

## 2024-02-06 RX ORDER — SODIUM HYPOCHLORITE 1.25 MG/ML
SOLUTION TOPICAL DAILY
Status: CANCELLED | OUTPATIENT
Start: 2024-02-07

## 2024-02-06 RX ORDER — ACETAMINOPHEN 650 MG/1
650 SUPPOSITORY RECTAL EVERY 6 HOURS PRN
Status: CANCELLED | OUTPATIENT
Start: 2024-02-06

## 2024-02-06 RX ORDER — POLYETHYLENE GLYCOL 3350 17 G/17G
17 POWDER, FOR SOLUTION ORAL DAILY PRN
Status: CANCELLED | OUTPATIENT
Start: 2024-02-06

## 2024-02-06 RX ORDER — ARTIFICIAL TEARS 1; 2; 3 MG/ML; MG/ML; MG/ML
1 SOLUTION/ DROPS OPHTHALMIC 2 TIMES DAILY
Status: DISCONTINUED | OUTPATIENT
Start: 2024-02-06 | End: 2024-02-08 | Stop reason: HOSPADM

## 2024-02-06 RX ORDER — ACETAMINOPHEN 650 MG/1
650 SUPPOSITORY RECTAL EVERY 6 HOURS PRN
Status: DISCONTINUED | OUTPATIENT
Start: 2024-02-06 | End: 2024-02-08 | Stop reason: HOSPADM

## 2024-02-06 RX ORDER — ACETAMINOPHEN 650 MG/1
650 SUPPOSITORY RECTAL EVERY 6 HOURS PRN
Status: DISCONTINUED | OUTPATIENT
Start: 2024-02-06 | End: 2024-02-06 | Stop reason: SDUPTHER

## 2024-02-06 RX ORDER — ONDANSETRON 2 MG/ML
4 INJECTION INTRAMUSCULAR; INTRAVENOUS EVERY 6 HOURS PRN
Status: DISCONTINUED | OUTPATIENT
Start: 2024-02-06 | End: 2024-02-06 | Stop reason: SDUPTHER

## 2024-02-06 RX ORDER — SODIUM CHLORIDE 9 MG/ML
INJECTION, SOLUTION INTRAVENOUS PRN
Status: CANCELLED | OUTPATIENT
Start: 2024-02-06

## 2024-02-06 RX ORDER — ACETAMINOPHEN 325 MG/1
650 TABLET ORAL EVERY 6 HOURS PRN
Status: CANCELLED | OUTPATIENT
Start: 2024-02-06

## 2024-02-06 RX ORDER — HYDROMORPHONE HYDROCHLORIDE 1 MG/ML
0.5 INJECTION, SOLUTION INTRAMUSCULAR; INTRAVENOUS; SUBCUTANEOUS
Status: DISCONTINUED | OUTPATIENT
Start: 2024-02-06 | End: 2024-02-06 | Stop reason: HOSPADM

## 2024-02-06 RX ORDER — ONDANSETRON 4 MG/1
4 TABLET, ORALLY DISINTEGRATING ORAL EVERY 8 HOURS PRN
Status: CANCELLED | OUTPATIENT
Start: 2024-02-06

## 2024-02-06 RX ORDER — ACETAMINOPHEN 325 MG/1
650 TABLET ORAL EVERY 6 HOURS PRN
Status: DISCONTINUED | OUTPATIENT
Start: 2024-02-06 | End: 2024-02-06 | Stop reason: SDUPTHER

## 2024-02-06 RX ORDER — SODIUM CHLORIDE 0.9 % (FLUSH) 0.9 %
5-40 SYRINGE (ML) INJECTION PRN
Status: CANCELLED | OUTPATIENT
Start: 2024-02-06

## 2024-02-06 RX ORDER — SODIUM CHLORIDE 0.9 % (FLUSH) 0.9 %
5-40 SYRINGE (ML) INJECTION EVERY 12 HOURS SCHEDULED
Status: CANCELLED | OUTPATIENT
Start: 2024-02-06

## 2024-02-06 RX ORDER — LORAZEPAM 2 MG/ML
1 INJECTION INTRAMUSCULAR EVERY 6 HOURS PRN
Status: CANCELLED | OUTPATIENT
Start: 2024-02-06

## 2024-02-06 RX ORDER — LIDOCAINE HYDROCHLORIDE AND EPINEPHRINE 10; 10 MG/ML; UG/ML
20 INJECTION, SOLUTION INFILTRATION; PERINEURAL ONCE
Status: COMPLETED | OUTPATIENT
Start: 2024-02-06 | End: 2024-02-06

## 2024-02-06 RX ORDER — PANTOPRAZOLE SODIUM 40 MG/1
40 TABLET, DELAYED RELEASE ORAL 2 TIMES DAILY
Status: CANCELLED | OUTPATIENT
Start: 2024-02-06

## 2024-02-06 RX ORDER — DEXTROSE MONOHYDRATE 100 MG/ML
INJECTION, SOLUTION INTRAVENOUS CONTINUOUS PRN
Status: DISCONTINUED | OUTPATIENT
Start: 2024-02-06 | End: 2024-02-08 | Stop reason: HOSPADM

## 2024-02-06 RX ORDER — PANTOPRAZOLE SODIUM 40 MG/1
40 TABLET, DELAYED RELEASE ORAL
Status: DISCONTINUED | OUTPATIENT
Start: 2024-02-06 | End: 2024-02-08 | Stop reason: HOSPADM

## 2024-02-06 RX ORDER — LORAZEPAM 2 MG/ML
1 INJECTION INTRAMUSCULAR EVERY 6 HOURS PRN
Status: DISCONTINUED | OUTPATIENT
Start: 2024-02-06 | End: 2024-02-08 | Stop reason: HOSPADM

## 2024-02-06 RX ORDER — DEXTROSE MONOHYDRATE 100 MG/ML
INJECTION, SOLUTION INTRAVENOUS CONTINUOUS PRN
Status: CANCELLED | OUTPATIENT
Start: 2024-02-06

## 2024-02-06 RX ORDER — ONDANSETRON 2 MG/ML
4 INJECTION INTRAMUSCULAR; INTRAVENOUS EVERY 6 HOURS PRN
Status: CANCELLED | OUTPATIENT
Start: 2024-02-06

## 2024-02-06 RX ORDER — HYDROMORPHONE HYDROCHLORIDE 1 MG/ML
0.25 INJECTION, SOLUTION INTRAMUSCULAR; INTRAVENOUS; SUBCUTANEOUS
Status: CANCELLED | OUTPATIENT
Start: 2024-02-06

## 2024-02-06 RX ORDER — SODIUM HYPOCHLORITE 1.25 MG/ML
SOLUTION TOPICAL DAILY
Status: DISCONTINUED | OUTPATIENT
Start: 2024-02-07 | End: 2024-02-08 | Stop reason: HOSPADM

## 2024-02-06 RX ORDER — LORAZEPAM 2 MG/ML
1 CONCENTRATE ORAL
Status: DISCONTINUED | OUTPATIENT
Start: 2024-02-06 | End: 2024-02-06 | Stop reason: HOSPADM

## 2024-02-06 RX ORDER — SODIUM CHLORIDE 9 MG/ML
INJECTION, SOLUTION INTRAVENOUS PRN
Status: DISCONTINUED | OUTPATIENT
Start: 2024-02-06 | End: 2024-02-08 | Stop reason: HOSPADM

## 2024-02-06 RX ORDER — GLUCAGON 1 MG/ML
1 KIT INJECTION PRN
Status: DISCONTINUED | OUTPATIENT
Start: 2024-02-06 | End: 2024-02-08 | Stop reason: HOSPADM

## 2024-02-06 RX ORDER — SODIUM CHLORIDE 0.9 % (FLUSH) 0.9 %
5-40 SYRINGE (ML) INJECTION PRN
Status: DISCONTINUED | OUTPATIENT
Start: 2024-02-06 | End: 2024-02-08 | Stop reason: HOSPADM

## 2024-02-06 RX ORDER — HYDROMORPHONE HYDROCHLORIDE 1 MG/ML
0.5 INJECTION, SOLUTION INTRAMUSCULAR; INTRAVENOUS; SUBCUTANEOUS
Status: CANCELLED | OUTPATIENT
Start: 2024-02-06

## 2024-02-06 RX ORDER — ONDANSETRON 4 MG/1
4 TABLET, ORALLY DISINTEGRATING ORAL EVERY 8 HOURS PRN
Status: DISCONTINUED | OUTPATIENT
Start: 2024-02-06 | End: 2024-02-06 | Stop reason: SDUPTHER

## 2024-02-06 RX ORDER — POLYETHYLENE GLYCOL 3350 17 G/17G
17 POWDER, FOR SOLUTION ORAL DAILY PRN
Status: DISCONTINUED | OUTPATIENT
Start: 2024-02-06 | End: 2024-02-08 | Stop reason: HOSPADM

## 2024-02-06 RX ORDER — MORPHINE SULFATE 20 MG/ML
5 SOLUTION ORAL
Status: CANCELLED | OUTPATIENT
Start: 2024-02-06

## 2024-02-06 RX ORDER — HYDROMORPHONE HYDROCHLORIDE 1 MG/ML
0.25 INJECTION, SOLUTION INTRAMUSCULAR; INTRAVENOUS; SUBCUTANEOUS
Status: DISCONTINUED | OUTPATIENT
Start: 2024-02-06 | End: 2024-02-08 | Stop reason: HOSPADM

## 2024-02-06 RX ORDER — HYDROMORPHONE HYDROCHLORIDE 1 MG/ML
0.25 INJECTION, SOLUTION INTRAMUSCULAR; INTRAVENOUS; SUBCUTANEOUS
Status: DISCONTINUED | OUTPATIENT
Start: 2024-02-06 | End: 2024-02-06 | Stop reason: HOSPADM

## 2024-02-06 RX ORDER — ONDANSETRON 2 MG/ML
4 INJECTION INTRAMUSCULAR; INTRAVENOUS EVERY 6 HOURS PRN
Status: DISCONTINUED | OUTPATIENT
Start: 2024-02-06 | End: 2024-02-08 | Stop reason: HOSPADM

## 2024-02-06 RX ORDER — LORAZEPAM 2 MG/ML
1 CONCENTRATE ORAL
Status: CANCELLED | OUTPATIENT
Start: 2024-02-06

## 2024-02-06 RX ORDER — LORAZEPAM 2 MG/ML
1 INJECTION INTRAMUSCULAR EVERY 6 HOURS PRN
Status: DISCONTINUED | OUTPATIENT
Start: 2024-02-06 | End: 2024-02-06 | Stop reason: HOSPADM

## 2024-02-06 RX ORDER — GLUCAGON 1 MG/ML
1 KIT INJECTION PRN
Status: CANCELLED | OUTPATIENT
Start: 2024-02-06

## 2024-02-06 RX ORDER — LORAZEPAM 2 MG/ML
1 CONCENTRATE ORAL
Status: DISCONTINUED | OUTPATIENT
Start: 2024-02-06 | End: 2024-02-08 | Stop reason: HOSPADM

## 2024-02-06 RX ORDER — POLYETHYLENE GLYCOL 3350 17 G/17G
17 POWDER, FOR SOLUTION ORAL DAILY PRN
Status: DISCONTINUED | OUTPATIENT
Start: 2024-02-06 | End: 2024-02-06 | Stop reason: SDUPTHER

## 2024-02-06 RX ORDER — SODIUM CHLORIDE 0.9 % (FLUSH) 0.9 %
5-40 SYRINGE (ML) INJECTION EVERY 12 HOURS SCHEDULED
Status: DISCONTINUED | OUTPATIENT
Start: 2024-02-06 | End: 2024-02-08 | Stop reason: HOSPADM

## 2024-02-06 RX ORDER — MORPHINE SULFATE 20 MG/ML
5 SOLUTION ORAL
Status: DISCONTINUED | OUTPATIENT
Start: 2024-02-06 | End: 2024-02-06 | Stop reason: HOSPADM

## 2024-02-06 RX ADMIN — SODIUM CHLORIDE, PRESERVATIVE FREE 10 ML: 5 INJECTION INTRAVENOUS at 12:29

## 2024-02-06 RX ADMIN — HYDROMORPHONE HYDROCHLORIDE 0.5 MG: 1 INJECTION, SOLUTION INTRAMUSCULAR; INTRAVENOUS; SUBCUTANEOUS at 17:28

## 2024-02-06 RX ADMIN — PIPERACILLIN AND TAZOBACTAM 3375 MG: 3; .375 INJECTION, POWDER, FOR SOLUTION INTRAVENOUS; PARENTERAL at 12:35

## 2024-02-06 RX ADMIN — PIPERACILLIN AND TAZOBACTAM 3375 MG: 3; .375 INJECTION, POWDER, FOR SOLUTION INTRAVENOUS; PARENTERAL at 00:33

## 2024-02-06 RX ADMIN — DEXTRAN 70, GLYCERIN, HYPROMELLOSE 1 DROP: 1; 2; 3 SOLUTION/ DROPS OPHTHALMIC at 12:27

## 2024-02-06 RX ADMIN — LIDOCAINE HYDROCHLORIDE,EPINEPHRINE BITARTRATE 20 ML: 10; .01 INJECTION, SOLUTION INFILTRATION; PERINEURAL at 15:44

## 2024-02-06 RX ADMIN — Medication: at 15:45

## 2024-02-06 RX ADMIN — Medication: at 12:29

## 2024-02-06 RX ADMIN — LORAZEPAM 1 MG: 2 INJECTION INTRAMUSCULAR; INTRAVENOUS at 17:28

## 2024-02-06 RX ADMIN — DEXTRAN 70, GLYCERIN, HYPROMELLOSE 1 DROP: 1; 2; 3 SOLUTION/ DROPS OPHTHALMIC at 00:32

## 2024-02-06 ASSESSMENT — PAIN SCALES - GENERAL: PAINLEVEL_OUTOF10: 8

## 2024-02-06 NOTE — PROGRESS NOTES
Ph: [phone], Fax: [phone]                                         [facility]                    Reason for admission:                 Sepsis    Brief Summary :     Avelina Ponce is being seen by nephrology for ESRD on HD.      Interval History and plan:     Labs reviewed      Continue HD on MWF schedule  Toelrated HD well yesterday  No need for RRT today   BP and labs reviewed and acceptable  No need for PO4 binders   On ABX  Holding antihypertensives except low dose losartan     Sepsis workup and management per primary team                     Assessment :     ESRD on HD  Likely 2/2 uncontrolled diabetes and HTN  Continue M, W, F HD    Anemia of Chronic Disease  Iron studies pending  Folate 8.21  Vit B12 1157  Retacrit with HD  Hepatitis profile is non reactive     Hypertension  Continue home carvedilol, losartan and amlodipine    Hyperlipidemia   Continue atorvastatin 40mg QD    Bone  PTH, Vit D, Phos pending    Sepsis  Unknown source  On vancomycin and cefepime  Blood cultures pending  Manage per primary team        [facility] Nephrology would like to thank Britni Agosto MD   for opportunity to serve this patient      Please call with questions at-   24 Hrs Answering service [phone] or  7 am- 5 pm via Perfect serve or cell phone  Dr. Lamberto Vigil MD PGY-1       HPI :     Avelina Ponce is a 78 y.o. female presented to the hospital on 1/31/2024 with fever. She was found to have a fever of 102 at her nursing facility. On presentation temp of 100.2, WBC 16.2, meeting sepsis criteria. Patient denies any recent chills, cough, SOB, N/V, bowel changes, abdominal pain. Exam shows no signs of superficial infection. CXR shows small bilateral effusions.       PMH/PSH/SH/Family History:     Past Medical History:   Diagnosis Date    Coronary artery disease     Diabetes mellitus (HCC)     End stage renal disease on dialysis (HCC)     History of pulmonary embolism 
                              Ph: [phone], Fax: [phone]                                         [facility]                    Reason for admission:                 Sepsis    Brief Summary :     Avelina Ponce is being seen by nephrology for ESRD on HD.      Interval History and plan:     Labs reviewed      Continue HD on MWF schedule  No need for RRT today   Volume stable   Plan for HD in am   BP and labs reviewed and acceptable  No need for PO4 binders   On ABX  Holding antihypertensives except low dose losartan     Sepsis workup and management per primary team                     Assessment :     ESRD on HD  Likely 2/2 uncontrolled diabetes and HTN  Continue M, W, F HD    Anemia of Chronic Disease  Iron studies pending  Folate 8.21  Vit B12 1157  Retacrit with HD  Hepatitis profile is non reactive     Hypertension  Continue home carvedilol, losartan and amlodipine    Hyperlipidemia   Continue atorvastatin 40mg QD    Bone  PTH, Vit D, Phos pending    Sepsis  Unknown source  On vancomycin and cefepime  Blood cultures pending  Manage per primary team        [facility] would like to thank Gisell Walters MD   for opportunity to serve this patient      Please call with questions at-   24 Hrs Answering service [phone] or  7 am- 5 pm via Perfect serve or cell phone  Dr. Lamberto Vigil MD PGY-1       HPI :     Avelina Ponce is a 78 y.o. female presented to the hospital on 1/31/2024 with fever. She was found to have a fever of 102 at her nursing facility. On presentation temp of 100.2, WBC 16.2, meeting sepsis criteria. Patient denies any recent chills, cough, SOB, N/V, bowel changes, abdominal pain. Exam shows no signs of superficial infection. CXR shows small bilateral effusions.       PMH/PSH/SH/Family History:     Past Medical History:   Diagnosis Date    Coronary artery disease     Diabetes mellitus (HCC)     End stage renal disease on dialysis (HCC)     History of pulmonary 
                              Ph: [phone], Fax: [phone]                                         [facility]                    Reason for admission:                 Sepsis    Brief Summary :     Avelina Ponce is being seen by nephrology for ESRD on HD.      Interval History and plan:     Labs are pending from today   HD yesterday with no fluid removal       Continue HD on MWF schedule  Volume stable   On ABX  Continue retacrit with HD for anemia   Holding antihypertensives and started midodrine TID  Sepsis workup and management per primary team                     Assessment :     ESRD on HD  Likely 2/2 uncontrolled diabetes and HTN  Continue M, W, F HD    Anemia of Chronic Disease  Iron studies pending  Folate 8.21  Vit B12 1157  Retacrit with HD  Hepatitis profile is non reactive     Hypertension  Continue home carvedilol, losartan and amlodipine    Hyperlipidemia   Continue atorvastatin 40mg QD    Bone  PTH, Vit D, Phos pending    Sepsis  Unknown source  On vancomycin and cefepime  Blood cultures pending  Manage per primary team        [facility] Nephrology would like to thank Philip Oquendo MD   for opportunity to serve this patient      Please call with questions at-   24 Hrs Answering service [phone] or  7 am- 5 pm via Perfect serve or cell phone  Dr. Chilango Alfredo MD PGY-1       HPI :     Avelina Ponce is a 78 y.o. female presented to the hospital on 1/31/2024 with fever. She was found to have a fever of 102 at her nursing facility. On presentation temp of 100.2, WBC 16.2, meeting sepsis criteria. Patient denies any recent chills, cough, SOB, N/V, bowel changes, abdominal pain. Exam shows no signs of superficial infection. CXR shows small bilateral effusions.       PMH/PSH/SH/Family History:     Past Medical History:   Diagnosis Date    Coronary artery disease     Diabetes mellitus (HCC)     End stage renal disease on dialysis (HCC)     History of pulmonary embolism 2018    
      Comprehensive Nutrition Assessment    RECOMMENDATIONS:  PO Diet: d/c cardiac diet, start 3CC  ONS: Start Glucerna once daily  Switch to Nepro BID once product available  Nutrition Education: Education not indicated     NUTRITION ASSESSMENT:   Nutritional summary & status: +IP for wounds.  Pt w/ stage 3 wound to coccyx.  Pt admitted w/ sepsis which is a contraindication to arginine which is found in wound healing supplements.  Pt w/ poor PO intake since adm- <50%.  Pt w/ elevated BG >180.  Will modify diet for optimal PO intake while managing BG more strictly.  Breakfast tray untouched during RD encounter despite late morning visit.  Pt endorses poor appetite r/t pain/discomfort since hospital adm.  RD adding ONS to promote kcal and pro intake.   Admission // PMH: sepsis // ESRD on HD CHF DM2 s/p bilateral LE amputations, PE on anticoagulation, CVA, HTN CAD    MALNUTRITION ASSESSMENT  Context of Malnutrition: Acute Illness   Malnutrition Status: Moderate malnutrition  Findings of the 6 clinical characteristics of malnutrition (Minimum of 2 out of 6 clinical characteristics is required to make the diagnosis of moderate or severe Protein Calorie Malnutrition based on AND/ASPEN Guidelines):  Energy Intake:  Mild decrease in energy intake (Comment)  Weight Loss:  No significant weight loss     Body Fat Loss:  Mild body fat loss Buccal region   Muscle Mass Loss:  Mild muscle mass loss      NUTRITION DIAGNOSIS   Increased nutrient needs related to increase demand for energy/nutrients as evidenced by wounds    Nutrition Monitoring and Evaluation:   Food/Nutrient Intake Outcomes:  Food and Nutrient Intake, Supplement Intake  Physical Signs/Symptoms Outcomes:  Biochemical Data, Nutrition Focused Physical Findings, Weight, Skin     OBJECTIVE DATA: Significant to nutrition assessment  Nutrition Related Findings: +bm PTA; no edema; labs reviewed  Wounds: Stage III (coccyx)  Nutrition Goals: PO intake 50% or greater, by 
    V2.0    [facility] [place]      Name:  Avelina Ponce /Age/Sex: 1945  (78 y.o. female)   MRN & CSN:  [id number] & [id number] Encounter Date/Time: 2024 7:39 AM EST   Location:  [place] [address] PCP: Nena Booker MD     Attending:Philip Oquendo MD       Hospital Day: 2    Assessment and Recommendations     Sepsis/fever possible due to pneumonia  S/p sepsis bolus because BP stable and ESRD on HD  Follow up Blood Cx  Broad spectrum abx     ESRD on HD, JOSE Flores present  - nephrology consulted for management     DM2  Insulin is high risk medication with narrow therapeutic index, reviewed BG trend and adjusted insulin doses  Hypoglycemia protocol in place  Carb-controlled diet     Cont home coreg Norvasc with parameters    Hx of Bilateral BKA      Diet ADULT DIET; Regular; 4 carb choices (60 gm/meal); Low Fat/Low Chol/High Fiber/2 gm Na   DVT Prophylaxis [] Lovenox, [x]  Heparin, [] SCDs, [] Ambulation,  [] Eliquis, [] Xarelto  [] Coumadin   Code Status Full Code   Disposition From: LTC  Expected Disposition: 1-2 days back to LTC if afebrile and blood Cx NGTD   Surrogate Decision Maker/ POA  Not applicable     Personally reviewed Lab Studies and Imaging   Discussed management of the case with case management and nephrology  Imaging that was interpreted personally includes CT chest w/ bibasilar pna    Drugs that require monitoring for toxicity include vancomycin and the method of monitoring was Vanc level monitoring and RFTs      HPI:  Avelina Ponce is a 78 y.o. female  PMHx significant for ESRD on HD (MWF), CHF DM2 s/p bilateral LE amputations, PE on anticoagulation, CVA, HTN CAD s/p CABG x 4, HFrEF  - lives in long term care facility, presents for fever, 102F.  Patient reported that she felt hot.  Denied chills, cough/congestion, dysuria, abd pain, chest pain/pressure, nausea/vomiting.    Missed dialysis because she was not herself, not feeling well     In the ER - Temp 100.2  WBC 16.2, Hgb 9.5  Na 135, 
    V2.0    [facility] [place]      Name:  Avelina Ponce /Age/Sex: 1945  (78 y.o. female)   MRN & CSN:  [id number] & [id number] Encounter Date/Time: 2024 7:39 AM EST   Location:  [facility] PCP: Nena Booker MD     Attending:Philip Oquendo MD       Hospital Day: 3    Assessment and Recommendations     Sepsis/fever possible due to pneumonia  S/p sepsis bolus because BP stable and ESRD on HD  Blood Cx NGTD  MRSA probe -ve so dc Vanc  More lethargic and confused so DC cefepime  Start Levaquin renal dose 500 mg q 48 hrs  Elevated procal but not reliable in ESRD -- Repeat to see trend  Elevated ferrtiin, leukocytosis persistent  After 6 weeks will need repeat imaging to ensure resolution of pna     ESRD on HD, RUE Fistule present  - nephrology following for management     DM2  Insulin is high risk medication with narrow therapeutic index, reviewed BG trend and adjusted insulin doses  Hypoglycemia protocol in place  Carb-controlled diet     Cont home coreg Norvasc with parameters    Hx of Bilateral BKA    Hx of DVT/PT  Supratherapeutic INR  Despite holding Warfarin went up to 7.18 today  No acute signs of bleeding  Pharmacy assisting with coumadin management  No need for Vitamin K for now      Diet ADULT DIET; Regular; 3 carb choices (45 gm/meal)  ADULT ORAL NUTRITION SUPPLEMENT; Lunch; Diabetic Oral Supplement   DVT Prophylaxis [] Lovenox, [x]  Heparin, [] SCDs, [] Ambulation,  [] Eliquis, [] Xarelto  [] Coumadin   Code Status Full Code   Disposition From: LT  Expected Disposition: 1-2 days back to LTC if improvement in confusion, leukocytosis   Surrogate Decision Maker/ POA  Not applicable     Personally reviewed Lab Studies and Imaging     Drugs that require monitoring for toxicity include coumadin and the method of monitoring was INR monitoring and assistance with pharmacy      HPI:  Avelina Ponce is a 78 y.o. female  PMHx significant for ESRD on HD (MWF), CHF DM2 s/p bilateral LE amputations, PE 
  Pharmacy  Note  - Admission Medication History    List of [id number] medications is complete.   I reviewed Rx fill history via med list provided by [facility].      The following changes made to [id number] medication list:    ADDED:   1) None    Dose or Frequency CHANGE:  1) Carvedilol - dose changed from 6.25 mg BID to 37.5 mg BID per med list  2) Cholecalciferol - dose changed from 625 mcg daily to 1250 mcg daily per med list  3) Warfarin - dose changed from 2.5 mg daily to 4.5 mg daily per med list    REMOVED:  1) None      Current Outpatient Medications   Medication Instructions    acetaminophen (TYLENOL) 650 mg, Oral, EVERY 6 HOURS PRN    amLODIPine (NORVASC) 10 mg, Oral, DAILY WITH DINNER    atorvastatin (LIPITOR) 40 mg, Oral, DAILY    carvedilol (COREG) 25 mg, Oral, 2 TIMES DAILY    carvedilol (COREG) 12.5 mg, Oral, 2 TIMES DAILY, Take with 25 mg to total 37.5 mg, check bp before giving, if sbp <100 mmHg and / hr <60 hold coreg    Cholecalciferol 1.25 MG (79448 UT) TABS 1 tablet, Oral, DAILY    Continuous Blood Gluc  (DEXCOM G7 ) MICK Any Dexcom  works so long as it is compatible with the sensor    Continuous Blood Gluc Sensor (DEXCOM G7 SENSOR) MISC Any glucose sensor works    DIMETHICONE, TOPICAL, 2 % CREA Apply externally, Apply to buttocks/ ailin area each incontinence episode every shift     glucose (GLUTOSE) 37.5 mL, Oral, PRN    glycerin-hypromellose- (ARTIFICIAL TEARS) 0.2-0.2-1 % SOLN opthalmic solution 1 drop, Both Eyes, 2 TIMES DAILY    K Phos [place]-Sod Phos Di & Mono (PHOSPHA 250 NEUTRAL PO) 1 tablet, Oral, SEE ADMIN INSTRUCTIONS, Take once in AM on Tuesday, Thursday, and Saturday    lidocaine (LIDODERM) 5 % 1 patch, TransDERmal, DAILY, 12 hours on, 12 hours off. Apply 1 patch to left shoulder at dinner and remove in the morning     losartan (COZAAR) 100 mg, Oral, NIGHTLY, Hold if SBP<100    mirtazapine (REMERON) 7.5 mg, Oral, NIGHTLY    
  Pharmacy Note - Renal Dosing    Cefepime 1000mg Q12h for treatment of Hospital acquired pneumonia. Per [facility] Renal Dose Adjustment Policy, cefepime will be changed to 2000mg load followed by 1000mg Q24h extended infusion    Estimated Creatinine Clearance: Estimated Creatinine Clearance: 9 mL/min (A) (based on SCr of 3 mg/dL (H)).    Dialysis Status, BYRON, CKD: ckd hd mwf    BMI: Body mass index is 25.1 kg/m².    Rationale for Adjustment: Agent is renally eliminated.    Pharmacy will continue to monitor renal function and adjust dose as necessary.      Please call with any questions.    Thank you,    Sánchez Epperson, [facility]    
/40, MD messaged. Requested palliative care consult for pt, will await orders.   
4 Eyes Skin Assessment     NAME:  Avelina Ponce  YOB: 1945  MEDICAL RECORD NUMBER:  [id number]    The patient is being assessed for  Admission    I agree that at least one RN has performed a thorough Head to Toe Skin Assessment on the patient. ALL assessment sites listed below have been assessed.      Areas assessed by both nurses:    Head, Face, Ears, Shoulders, Back, Chest, Arms, Elbows, Hands, Sacrum. Buttock, Coccyx, Ischium, Legs. Feet and Heels, and Under Medical Devices         Does the Patient have a Wound? Yes wound(s) were present on assessment. LDA wound assessment was Initiated and completed by RN       Wound noted on coccyx  Scars on bilateral lower extremities    Ishmael Prevention initiated by RN: Yes  Wound Care Orders initiated by RN: Yes    Pressure Injury (Stage 3,4, Unstageable, DTI, NWPT, and Complex wounds) if present, place Wound referral order by RN under : Yes    New Ostomies, if present place, Ostomy referral order under : No     Nurse 1 eSignature: Electronically signed by Lynn Nowak RN on 2/1/24 at 7:39 PM EST    **SHARE this note so that the co-signing nurse can place an eSignature**    Nurse 2 eSignature: Electronically signed by Toby Lake RN on 2/1/24 at 8:28 PM EST    
Attempted to wean patient off 1L of o2. Upon removing nasal canula patient's oxygen saturation dropped down to 88%. Replaced nasal canula back on patient and saturation went to 96%. Patient in bed with eyes closed and call light in reach.  
CT Chest results relayed to Dr. Jordan. Electronically signed by Rita Temple RN on 2/1/2024 at 6:36 AM   
Clinical Pharmacy Progress Note    Vancomycin - Management by Pharmacy    Consult Date(s): 1/31/24  Consulting Provider(s):     Assessment / Plan  Sepsis of Unknown Etiology - Vancomycin  Concurrent Antimicrobials: cefepime  Day of Vanc Therapy / Ordered Duration: 7 day  Current Dosing Method: Intermittent Dosing by Levels  Therapeutic Goal: Trough 10-15 mg/L  Current Dose / Plan:   1000 mg given in ED  Random level due 2/1 @ 0600  Will continue to monitor clinical condition and make adjustments to regimen as appropriate.    Thank you for consulting pharmacy,  Corina Gilmore, PharmD  Main Pharmacy: [id number]        Interval update:  ________    Subjective/Objective:   Avelina Ponce is a 78 y.o. female with a PMHx significant for -------- who is admitted with Sepsis.     Pharmacy is consulted to dose vancomycin.    Ht Readings from Last 1 Encounters:   08/17/23 1.321 m (4' 4\")     Wt Readings from Last 1 Encounters:   01/31/24 44.1 kg (97 lb 3.2 oz)     Current & Prior Antimicrobial Regimen(s):  cefepime    Vancomycin Level(s) / Doses:    Date Time Dose Type of Level / Level Interpretation                 Note: Serum levels collected for AUC-based dosing may be high if collected in close proximity to the dose administered. This is not necessarily indicative of toxicity.    Cultures & Sensitivities:    Date Site Micro Susceptibility / Result                 Recent Labs     01/31/24 1942   CREATININE 4.7*   BUN 61*   WBC 16.2*       Estimated Creatinine Clearance: 6 mL/min (A) (based on SCr of 4.7 mg/dL (H)).    Additional Lab Values / Findings of Note:    No results for input(s): \"PROCAL\" in the last 72 hours.   
Clinical Pharmacy Progress Note    Vancomycin - Management by Pharmacy    Consult Date(s): 1/31/24  Consulting Provider(s): Dr. Jordan    Assessment / Plan  Sepsis of Unknown Etiology - Vancomycin  Concurrent Antimicrobials: Cefepime 1 g IV q24h EI (Day #2 of 7)  Day of Vanc Therapy / Ordered Duration: Day #2 of 7  Current Dosing Method: Intermittent Dosing by Levels  Therapeutic Goal: Trough 10-15 mg/L  Current Dose / Plan:   Pt has known ESRD and is HD-dependent. Plan to continue HD on Mon/Wed/Fri per nephrology note today  Pt last received vancomycin 1000 mg IV x1 dose yesterday evening while in ED followed by intermittent dosing of vancomycin  Will plan to continue intermittent dosing of vancomycin at this time and will plan to obtain a pre-HD level tomorrow AM prior to re-dosing vancomycin (2/2, ordered)  MRSA nares ordered per protocol  Will continue to monitor clinical condition and make adjustments to regimen as appropriate    Thank you for consulting pharmacy,    Julissa Dougherty, PharmD, BCCCP  Clinical Pharmacy Specialist - Emergency Dept  Wireless: [phone]  2/1/2024 1:35 PM      Interval update:  Initiation    Subjective/Objective: Avelina Ponce is a 78 y.o. female with a PMHx significant for ESRD on HD (Mon/Wed/Fri), T2DM, HTN, HLD, s/p BKA, TIA, and PVD who is admitted with sepsis.     Pharmacy is consulted to dose vancomycin.    Ht Readings from Last 1 Encounters:   08/17/23 1.321 m (4' 4\")     Wt Readings from Last 1 Encounters:   02/01/24 45.5 kg (100 lb 3.2 oz)     Current & Prior Antimicrobial Regimen(s):  Cefepime 1 g IV q24h EI (1/31-current)  Vancomycin IV PTD   Intermittent dosing (1/31-current)  1000 mg IV x1 (1/31)    Vancomycin Level(s) / Doses:  Date Vanc Level Vanc Dose Notes   1/31 -- 1000 mg IV x1    2/1  --    2/2   Due for HD (M/W/F)     Date Time Dose Type of Level / Level Interpretation                 Note: Serum levels collected for AUC-based dosing may be high if collected in close 
Clinical Pharmacy Progress Note    Warfarin - Management by Pharmacy    Consult Date(s): 2/1/24  Consulting Provider(s): Dr. Jordan    Assessment / Plan  1)  h/o DVT / PE - Warfarin  Goal INR: 2 - 3  Concurrent Anticoagulants / Antiplatelets: none  Interactions: Levaquin may enhance the anticoagulant effect of warfarin (started on 2/2- continue to monitor)  Current Regimen / Plan:   INR today = 5.31 downtrending after warfarin hold last night  Unclear reason for supratherapeutic INR on admission.  Will continue to HOLD warfarin until INR is steadily decreasing and approaches 3.  Would not recommend reversal with Vit K unless pt has S/Sx of active bleeding or urgent procedure is needed.  Will monitor pt's clinical status and INR daily, and make dose adjustments as needed.    Please call with questions--  Thanks--  Omayra BettencourtD  PGY1 Resident   Ext. [phone]  2/3/2024 7:46 AM      Interval update:  Afebrile and continues on room air. INR remains supratherapeutic today. Surgery consulted for sacral pressure wound- planning for debridement once INR is corrected.     Subjective/Objective:   Avelina Ponce is a 78 y.o. female with a PMHx significant for ESRD on HD MWF, HFrEF, DM 2, B/L LE amputations, PE (on Warfarin), CVA, HTN, and CAD s/p CABG x4 who is admitted with fevers and sepsis 2/2 possible pneumonia.     Pharmacy is consulted to dose Warfarin.    Ht Readings from Last 1 Encounters:   02/01/24 1.321 m (4' 4.01\")     Wt Readings from Last 1 Encounters:   02/03/24 43.8 kg (96 lb 9 oz)     Prior / Home Warfarin Regimen:  Warfarin 4.5mg po nightly - confirmed with NH paperwork    Date INR Warfarin   2/1 5.12 HOLD   2/2 7.18 HOLD   2/3 5.31 HOLD   2/4         Recent Labs     02/01/24  0927 02/02/24  0718 02/03/24  0513   INR 5.12* 7.18* 5.31*   HGB 8.9* 8.6* 9.0*    220 249   LABALBU 3.3* 3.0* 3.3*   CREATININE 5.1* 5.8* 3.0*         
Clinical Pharmacy Progress Note    Warfarin - Management by Pharmacy    Consult Date(s): 2/1/24  Consulting Provider(s): Dr. Jordan    Assessment / Plan  1)  h/o DVT / PE - Warfarin  Goal INR: 2 - 3  Concurrent Anticoagulants / Antiplatelets: none  Interactions: No significant acute interactions noted.  Current Regimen / Plan:   INR today = 7.18, increased from 5.12 yesterday despite holding dose.  Unclear reason for supratherapeutic INR on admission.  Will continue to HOLD warfarin until INR is steadily decreasing and approaches 3.  Would not recommend reversal with Vit K unless pt has S/Sx of active bleeding or urgent procedure is needed.  Will monitor pt's clinical status and INR daily, and make dose adjustments as needed.    Please call with questions--  Thanks--  Cristina Bailey, PharmD, BCPS, [facility]  [id number] ([facility])   2/2/2024 9:52 AM      Interval update:  Afebrile; weaned to room air last evening.  Vancomycin d/c'd as MRSA nasal PCR negative.  INR remains supratherapeutic today.    Subjective/Objective:   Avelina Ponce is a 78 y.o. female with a PMHx significant for ESRD on HD MWF, HFrEF, DM 2, B/L LE amputations, PE (on Warfarin), CVA, HTN, and CAD s/p CABG x4 who is admitted with fevers and sepsis 2/2 possible pneumonia.     Pharmacy is consulted to dose Warfarin.    Ht Readings from Last 1 Encounters:   02/01/24 1.321 m (4' 4.01\")     Wt Readings from Last 1 Encounters:   02/01/24 45.3 kg (99 lb 13.9 oz)     Prior / Home Warfarin Regimen:  Warfarin 4.5mg po nightly - confirmed with NH paperwork    Date INR Warfarin   2/1 5.12 HOLD   2/2 7.18 HOLD                 Recent Labs     01/31/24  1942 02/01/24  0927 02/02/24  0718   INR  --  5.12* 7.18*   HGB 9.5* 8.9* 8.6*    226 220   LABALBU 3.2* 3.3* 3.0*   CREATININE 4.7* 5.1* 5.8*       
Clinical Pharmacy Progress Note    Warfarin - Management by Pharmacy    Consult Date(s): 2/1/24  Consulting Provider(s): Dr. Jordan    Assessment / Plan  1)  h/o DVT / PE - Warfarin  Goal INR: 2 - 3  Concurrent Anticoagulants / Antiplatelets: none  Interactions: cefepime (started 2/3), mirtazapine-ordered. Both medications can enhance the anticoagulation effect of warfarin.   Current Regimen / Plan:   INR today 2.98 after trending down slowly by holding since admission.  Per general surgery, would like INR below 1.5.   Will continue to hold warfarin at this time and monitor for desired restart.    Confirmed with surgery that warfarin to remain on hold due to continued need for serial debridements for her sacral wound.   Also, per RN documentation overnight, issues with alertness re: administering PO medications.   Will monitor pt's clinical status and INR daily, and make dose adjustments as needed.    Please call with questions--  Thanks--  Severiano Cam, PharmD, [facility]  [phone]  02/06/24 1:15 PM       Interval update:  Discussed plan for warfarin with primary team.     Subjective/Objective:   Avelina Ponce is a 78 y.o. female with a PMHx significant for ESRD on HD MWF, HFrEF, DM 2, B/L LE amputations, PE (on Warfarin), CVA, HTN, and CAD s/p CABG x4 who is admitted with fevers and sepsis 2/2 possible pneumonia.     Pharmacy is consulted to dose Warfarin.    Ht Readings from Last 1 Encounters:   02/01/24 1.321 m (4' 4.01\")     Wt Readings from Last 1 Encounters:   02/06/24 42.1 kg (92 lb 13 oz)     Prior / Home Warfarin Regimen:  Warfarin 4.5mg po nightly - confirmed with NH paperwork    Date INR Warfarin   2/1 5.12 HOLD   2/2 7.18 HOLD   2/3 5.31 HOLD   2/4 4.23 HOLD   2/5 Not drawn    2/6 2.98 HOLD       Recent Labs     02/04/24  0548 02/05/24  0820 02/06/24  0622   INR 4.23*  --  2.98*   HGB 8.6* 8.0* 8.5*    244 270   LABALBU 2.8* 2.9*  --    CREATININE 4.4* 5.0* 2.7*         
Clinical Pharmacy Progress Note    Warfarin - Management by Pharmacy    Consult Date(s): 2/1/24  Consulting Provider(s): Dr. Jordan    Assessment / Plan  1)  h/o DVT / PE - Warfarin  Goal INR: 2 - 3  Concurrent Anticoagulants / Antiplatelets: none  Interactions: cefepime (started 2/3), mirtazapine-ordered. Both medications can enhance the anticoagulation effect of warfarin.   Current Regimen / Plan:   INR today = 4.23 and down-trending.   Unclear reason for supratherapeutic INR on admission.  Will continue to HOLD warfarin until INR is steadily decreasing and approaches 3.  Would not recommend reversal with Vit K unless pt has S/Sx of active bleeding or urgent procedure is needed.  If patient discharges, would recommend continuing to hold until INR is <3 and restart warfarin at a lower maintenance dose.   Will monitor pt's clinical status and INR daily, and make dose adjustments as needed.    Please call with questions--  Thanks--  Omayra BettencourtD  PGY1 Resident   Ext. [phone]  2/4/2024 7:16 AM      Interval update:  Afebrile and on 1L of O2. INR remains supratherapeutic today. Surgery consulted for sacral pressure wound- planning for debridement once INR is corrected.     Subjective/Objective:   Avelina Ponce is a 78 y.o. female with a PMHx significant for ESRD on HD MWF, HFrEF, DM 2, B/L LE amputations, PE (on Warfarin), CVA, HTN, and CAD s/p CABG x4 who is admitted with fevers and sepsis 2/2 possible pneumonia.     Pharmacy is consulted to dose Warfarin.    Ht Readings from Last 1 Encounters:   02/01/24 1.321 m (4' 4.01\")     Wt Readings from Last 1 Encounters:   02/04/24 42.3 kg (93 lb 4.1 oz)     Prior / Home Warfarin Regimen:  Warfarin 4.5mg po nightly - confirmed with NH paperwork    Date INR Warfarin   2/1 5.12 HOLD   2/2 7.18 HOLD   2/3 5.31 HOLD   2/4 4.23 HOLD       Recent Labs     02/02/24  0718 02/03/24  0513 02/04/24  0548   INR 7.18* 5.31* 4.23*   HGB 8.6* 9.0* 8.6*    249 244   LABALBU 3.0* 
Clinical Pharmacy Progress Note    Warfarin - Management by Pharmacy    Consult Date(s): 2/1/24  Consulting Provider(s): Dr. Jordan    Assessment / Plan  1)  h/o DVT / PE - Warfarin  Goal INR: 2 - 3  Concurrent Anticoagulants / Antiplatelets: none  Interactions: cefepime (started 2/3), mirtazapine-ordered. Both medications can enhance the anticoagulation effect of warfarin.   Current Regimen / Plan:   INR today ordered but appears hasn't drawn / resulted.  INR yesterday 4.23.  Unclear reason for supratherapeutic INR on admission.  Will continue to HOLD warfarin until INR is steadily decreasing and approaches 3.  Would not recommend reversal with Vit K unless pt has S/Sx of active bleeding or urgent procedure is needed.  Will monitor pt's clinical status and INR daily, and make dose adjustments as needed.    Please call with questions--  Thanks--  Cristina Bailey, PharmD, BCPS, BCGP  [phone] ([facility])   2/5/2024 3:21 PM        Interval update:   Surgery performed bedside debridement of  sacral pressure wound 2/4, with plans for serial debridements.    Subjective/Objective:   Avelina Ponce is a 78 y.o. female with a PMHx significant for ESRD on HD MWF, HFrEF, DM 2, B/L LE amputations, PE (on Warfarin), CVA, HTN, and CAD s/p CABG x4 who is admitted with fevers and sepsis 2/2 possible pneumonia.     Pharmacy is consulted to dose Warfarin.    Ht Readings from Last 1 Encounters:   02/01/24 1.321 m (4' 4.01\")     Wt Readings from Last 1 Encounters:   02/05/24 40.9 kg (90 lb 2.7 oz)     Prior / Home Warfarin Regimen:  Warfarin 4.5mg po nightly - confirmed with NH paperwork    Date INR Warfarin   2/1 5.12 HOLD   2/2 7.18 HOLD   2/3 5.31 HOLD   2/4 4.23 HOLD   2/5 Not drawn        Recent Labs     02/03/24  0513 02/04/24  0548 02/05/24  0820   INR 5.31* 4.23*  --    HGB 9.0* 8.6* 8.0*    244 244   LABALBU 3.3* 2.8* 2.9*   CREATININE 3.0* 4.4* 5.0*         
General Surgery   Resident Progress Note    Reason for Consult: Sacral wound, unstagable    Subjective/Interval History:  Patient was debrided yesterday and packed with Dakins Kerlix. No acute events overnight. Does not engage in conversation.     Physical exam:    Vitals:    02/04/24 1952 02/05/24 0049 02/05/24 0350 02/05/24 0539   BP: (!) 93/41 (!) 97/46 (!) 97/52    Pulse: 62 62 61    Resp: 16 16 16    Temp: 97.4 °F (36.3 °C) 97.9 °F (36.6 °C) 97.8 °F (36.6 °C)    TempSrc: Axillary Axillary Axillary    SpO2: 100% 100% 99%    Weight:    40.9 kg (90 lb 2.7 oz)   Height:           General appearance: Somnolent, chronically ill-appearing, thin  HEENT: Normocephalic, atraumatic; EOMI  Chest/Lungs: Normal inspiratory effort, symmetric chest rise, no accessory muscle use  Cardiovascular: Regular rate and rhythm; perfusing extremities  Abdomen: Soft, non-tender  Skin: Unstagable sacral pressure wound with persistent necrotic slough at wound base/edges, hemostatic  Extremities: s/p bilateral below-knee amputations  Neuro: unable to accurately assess orientation given mental status      Labs:    CBC:   Recent Labs     02/02/24  0718 02/03/24  0513 02/04/24  0548   WBC 18.7* 22.1* 22.7*   HGB 8.6* 9.0* 8.6*   HCT 26.7* 28.0* 26.7*   .3* 99.4 99.4    249 244       BMP:   Recent Labs     02/02/24  0718 02/03/24  0513 02/04/24  0548    135* 138   K 4.5 4.1 4.2    95* 98*   CO2 20* 26 25   PHOS  --  2.0* 3.3   BUN 73* 27* 38*   CREATININE 5.8* 3.0* 4.4*       Liver Profile:   Lab Results   Component Value Date/Time    AST 14 02/03/2024 05:13 AM    ALT 6 02/03/2024 05:13 AM    BILIDIR 0.3 02/03/2024 05:13 AM    BILITOT 0.6 02/03/2024 05:13 AM    ALKPHOS 118 02/03/2024 05:13 AM   No results found for: \"CHOL\", \"HDL\", \"TRIG\"  PT/INR:   Recent Labs     02/02/24  0718 02/03/24  0513 02/04/24  0548   PROTIME 60.9* 48.2* 40.4*   INR 7.18* 5.31* 4.23*           Imaging:   CT CHEST WO CONTRAST   Final Result    
Lab called for blood draw. Electronically signed by Rita Temple RN on 2/1/2024 at 4:01 AM   
MD's checked on wound debridement and changed the dressing. Patient in bed with eyes closed and call light in reach.  
Patient back to room. Electronically signed by Rita Temple RN on 2/1/2024 at 4:21 AM   
Patient continues on IV antibiotics with no adverse reaction. Patient verbalizes no pain but grimaces with movement. Patient in bed with eyes closed and call light in reach.  
Patient to CT.Electronically signed by Rita Temple RN on 2/1/2024 at 4:07 AM   
Patient's sacrum wound dressing change completed per order. Wound appears to have worsened since last seen by wound care RN on 2/2- increase eschar and slough present. Dressing previously in place also did not align with wound care orders (did not have gauze with dakins in place- only sacral heart present). No date or initials were found on sacral heart so unsure of when last dressing change was completed. Patient c/o increased pain during and after dressing change. MD Romeo notified who ordered tramadol q6 prn which was administered - see MAR. Will inform night shift RN to pass along to tomorrow's day shift RN to call wound care to assess deterioration.   
Pharmacy Note - Renal Dosing and Extended Infusion Beta-Lactam Adjustment    Cefepime ordered for treatment of sepsis. Per [facility] Renal Dose Adjustment Policy and Extended Infusion Beta-Lactam Policy, cefepime will be changed to 1000 mg every 24 hours.     Estimated Creatinine Clearance: Estimated Creatinine Clearance: 6 mL/min (A) (based on SCr of 4.7 mg/dL (H)).  Dialysis Status, BYRON, CKD: byron  BMI: Body mass index is 25.27 kg/m².    Rationale for Adjustment: Agent is renally eliminated and demonstrates time-dependent effect on bacterial eradication. Extended-infusion dosing strategy aims to enhance microbiologic and clinical efficacy.    Pharmacy will continue to monitor renal function, cultures and sensitivities (where available) and adjust dose as necessary.      Please call with any questions.  Corina Gilmore, PharmD  Main Pharmacy: [id number]     
Pt arrived to room 6327. Pt oriented to floor, tele applied. Pt lethargic, oriented to self, time. VSS, pt on RA. Bed alarm on. Skin assessed. Wound noted on coccyx, scars on BLE.   
Pt not alert to take PO medications. When asked what Pt name is, Pt groans in response. When asked what Pt  is, Pt responds, \"okay.\" Asked Pt to open eyes and look at me, Pt said okay but did not open eyelids. Pt unsafe to take PO medication. Pt groans and moans when moved or adjusted. Pt medications Remeron and Protonix not given this evening. NP notified.   
Pt off floor to dialysis    
Pt returned from HD   
Pt returned to floor  
Pt to HD   
RN notified from lab of pt's critical labs this morning    Creatinine: 5.8, PT: 60.9, INR: 7.18  Physician notified via [employer] (Dr. Oquendo)      
Surgery team message for clarification on dressing changes. If nursing is to to do them or surgery, awaiting response.   
Treatment time: 3 hours     Net UF: 2 L     Pre weight: 43 kg   Post weight: 41 kg  EDW:     Access used: R arm fistula   Access function: tolerated 15g, 350 BFR     Medications or blood products given: NA    Regular outpatient schedule: J[facility] Roach     Summary of response to treatment: tolerated without difficulties    Copy of dialysis treatment record placed in chart, to be scanned into EMR.  
Treatment time: 3 hrs    Net UF: 0 ml    Pre weight: 40.9 kg  Post weight: 40.9 kg  Access used: Rt UE AVF  Access function: Well tolerated, 350 BFR    Medications or blood products given: Retacrit 10,000 units    Regular outpatient schedule: MWF    Summary of response to treatment: Pt tolerated well. BP soft. Pt remained stable throughout entire treatment and upon exiting hemodialysis suite.     Copy of dialysis treatment record placed in chart, to be scanned into EMR.  
BID  [Held by provider] amLODIPine (NORVASC) tablet 10 mg, 10 mg, Oral, Dinner  atorvastatin (LIPITOR) tablet 40 mg, 40 mg, Oral, Daily  [Held by provider] carvedilol (COREG) tablet 12.5 mg, 12.5 mg, Oral, BID  insulin lispro (HUMALOG) injection vial 0-4 Units, 0-4 Units, SubCUTAneous, TID WC  insulin lispro (HUMALOG) injection vial 0-4 Units, 0-4 Units, SubCUTAneous, Nightly  warfarin placeholder: dosing by pharmacy, , Other, RX Placeholder  losartan (COZAAR) tablet 100 mg, 100 mg, Oral, Nightly  sodium chloride flush 0.9 % injection 5-40 mL, 5-40 mL, IntraVENous, 2 times per day  sodium chloride flush 0.9 % injection 5-40 mL, 5-40 mL, IntraVENous, PRN  0.9 % sodium chloride infusion, , IntraVENous, PRN  polyethylene glycol (GLYCOLAX) packet 17 g, 17 g, Oral, Daily PRN  acetaminophen (TYLENOL) tablet 650 mg, 650 mg, Oral, Q6H PRN **OR** acetaminophen (TYLENOL) suppository 650 mg, 650 mg, Rectal, Q6H PRN  [COMPLETED] cefepime (MAXIPIME) 1,000 mg in sodium chloride 0.9 % 50 mL IVPB (mini-bag), 1,000 mg, IntraVENous, Once **FOLLOWED BY** cefepime (MAXIPIME) 1,000 mg in sodium chloride 0.9 % 50 mL IVPB (mini-bag), 1,000 mg, IntraVENous, Q24H    Vitals :     Vitals:    02/02/24 0747   BP: (!) 111/47   Pulse: 63   Resp: 16   Temp: 98.3 °F (36.8 °C)   SpO2: 95%          Physical Examination :     appearance: Alert, orientated to person and time  Respiratory: no distress, on 2L O2 NC  Cardiovascular: no visibly raised JVD, no Edema   Abdomen: -  soft  Other relevant findings: bilateral LE amputation, functional fistula in R arm     Labs :     CBC:   Recent Labs     01/31/24 1942 02/01/24  0927   WBC 16.2* 19.8*   HGB 9.5* 8.9*   HCT 28.6* 28.2*    226       BMP:    Recent Labs     01/31/24 1942 02/01/24  0927   * 137   K 4.2 4.3   CL 97* 100   CO2 22 19*   BUN 61* 63*   CREATININE 4.7* 5.1*   GLUCOSE 189* 185*   PHOS  --  3.2       No results found for: \"VOL\", \"APPEARANCE\", \"COLORU\", \"LABSPEC\", \"LABPH\", 
arterial disease (HCC)     Systolic CHF (HCC)           Medication:     Current Facility-Administered Medications: glucose chewable tablet 16 g, 4 tablet, Oral, PRN  dextrose bolus 10% 125 mL, 125 mL, IntraVENous, PRN **OR** dextrose bolus 10% 250 mL, 250 mL, IntraVENous, PRN  glucagon injection 1 mg, 1 mg, SubCUTAneous, PRN  dextrose 10 % infusion, , IntraVENous, Continuous PRN  vitamin D (ERGOCALCIFEROL) capsule 50,000 Units, 50,000 Units, Oral, Weekly  artificial tears (dextran-hypromellose-glycerin) ophthalmic solution 1 drop, 1 drop, Both Eyes, BID  mirtazapine (REMERON) tablet 7.5 mg, 7.5 mg, Oral, Nightly  silver nitrate applicators applicator, , Topical, Once  traMADol (ULTRAM) tablet 50 mg, 50 mg, Oral, Q6H PRN  ondansetron (ZOFRAN) injection 4 mg, 4 mg, IntraVENous, Q6H PRN  [COMPLETED] ceFEPIme (MAXIPIME) 2,000 mg in sodium chloride 0.9 % 100 mL IVPB (mini-bag), 2,000 mg, IntraVENous, Once **FOLLOWED BY** cefepime (MAXIPIME) 1,000 mg in sodium chloride 0.9 % 50 mL IVPB (mini-bag), 1,000 mg, IntraVENous, Q24H  losartan (COZAAR) tablet 25 mg, 25 mg, Oral, Nightly  epoetin angelo-epbx (RETACRIT) injection 10,000 Units, 10,000 Units, IntraVENous, Once per day on Mon Wed Fri  sodium chloride 0.9 % bolus 100 mL, 100 mL, IntraVENous, PRN  sodium hypochlorite (DAKINS) 0.125 % external solution, , Topical, Daily  pantoprazole (PROTONIX) tablet 40 mg, 40 mg, Oral, BID  atorvastatin (LIPITOR) tablet 40 mg, 40 mg, Oral, Daily  insulin lispro (HUMALOG) injection vial 0-4 Units, 0-4 Units, SubCUTAneous, TID WC  insulin lispro (HUMALOG) injection vial 0-4 Units, 0-4 Units, SubCUTAneous, Nightly  warfarin placeholder: dosing by pharmacy, , Other, RX Placeholder  sodium chloride flush 0.9 % injection 5-40 mL, 5-40 mL, IntraVENous, 2 times per day  sodium chloride flush 0.9 % injection 5-40 mL, 5-40 mL, IntraVENous, PRN  0.9 % sodium chloride infusion, , IntraVENous, PRN  polyethylene glycol (GLYCOLAX) packet 17 g, 17 g, 
40 mg Oral BID    atorvastatin  40 mg Oral Daily    insulin lispro  0-4 Units SubCUTAneous TID WC    insulin lispro  0-4 Units SubCUTAneous Nightly    warfarin placeholder: dosing by pharmacy   Other RX Placeholder    sodium chloride flush  5-40 mL IntraVENous 2 times per day      Infusions:    dextrose      sodium chloride Stopped (02/02/24 0339)     PRN Meds: glucose, 4 tablet, PRN  dextrose bolus, 125 mL, PRN   Or  dextrose bolus, 250 mL, PRN  glucagon (rDNA), 1 mg, PRN  dextrose, , Continuous PRN  sodium chloride, 100 mL, PRN  sodium chloride flush, 5-40 mL, PRN  sodium chloride, , PRN  polyethylene glycol, 17 g, Daily PRN  acetaminophen, 650 mg, Q6H PRN   Or  acetaminophen, 650 mg, Q6H PRN        Labs and Imaging   CT CHEST WO CONTRAST    Result Date: 2/1/2024  CT chest without contrast HISTORY: Sepsis COMPARISON: none CONTRAST: none TECHNIQUE: Individualized dose optimization technique was used in order to meet ALARA standards for radiation dose reduction. In addition to gender specific dose reduction algorithms, the dose reduction techniques vary based on the specific scanner utilized but frequently include automated exposure control, adjustment of the mA and/or kV according to patient size, and use of iterative reconstruction technique. COMMENTS: Degenerative changes in the spine with scoliosis. Age-indeterminate compression fracture of T12. Changes of prior CABG. Venous stents extending from the right brachial vein to the superior vena cava. Atrophic native kidneys. Upper abdomen is otherwise unremarkable. Extensive diffuse arterial calcification. Mild cardiomegaly. Mediastinal structures are otherwise unremarkable within the limitations of a noncontrast examination. Small bilateral pleural effusions. Bilateral lower lobe consolidation-atelectasis versus pneumonia.     Examination limited by lack of contrast. Bibasilar pulmonary consolidation. Small bilateral pleural effusions. Age-indeterminate compression 
Decreased awareness of need for safety;Decreased awareness of need for assistance  Insights: Decreased awareness of deficits  Initiation: Requires cues for some  Sequencing: Requires cues for some  Orientation  Overall Orientation Status: Within Functional Limits (knew month but not year; stated \" hospital\")    Education Given To: Patient  Education Provided: Role of Therapy;Plan of Care;Transfer Training;Fall Prevention Strategies  Education Method: Verbal  Barriers to Learning: None  Education Outcome: Verbalized understanding;Demonstrated understanding    AM-PAC - ADL  AM-PAC Daily Activity - Inpatient   How much help is needed for putting on and taking off regular lower body clothing?: Total  How much help is needed for bathing (which includes washing, rinsing, drying)?: A Lot  How much help is needed for toileting (which includes using toilet, bedpan, or urinal)?: Total  How much help is needed for putting on and taking off regular upper body clothing?: A Lot  How much help is needed for taking care of personal grooming?: A Little  How much help for eating meals?: A Little  AM-PAC Inpatient Daily Activity Raw Score: 12  AM-PAC Inpatient ADL T-Scale Score : 30.6  ADL Inpatient CMS 0-100% Score: 66.57  ADL Inpatient CMS G-Code Modifier : CL    Safety Devices  Type of Devices: Left in bed;Nurse notified;Call light within reach (in ED on stretcher)    Therapy Time   Individual Concurrent Group Co-treatment   Time In 0907         Time Out 0947         Minutes 40         Timed Code Treatment Minutes:  25 (+15 min eval)  Total Treatment Minutes:  40    If patient discharges prior to next session this note will serve as a discharge summary.  Please see below for the latest assessment towards goals.     Nelly Donohue OTR/L, [id number]    
bilateral BKA      Medications:   Medications:    midodrine  10 mg Oral TID     vitamin D  50,000 Units Oral Weekly    artificial tears (dextran-hypromellose-glycerin)  1 drop Both Eyes BID    mirtazapine  7.5 mg Oral Nightly    silver nitrate applicators   Topical Once    cefepime  1,000 mg IntraVENous Q24H    epoetin angelo-epbx  10,000 Units IntraVENous Once per day on Mon Wed Fri    sodium hypochlorite   Topical Daily    pantoprazole  40 mg Oral BID    atorvastatin  40 mg Oral Daily    insulin lispro  0-4 Units SubCUTAneous TID     insulin lispro  0-4 Units SubCUTAneous Nightly    warfarin placeholder: dosing by pharmacy   Other RX Placeholder    sodium chloride flush  5-40 mL IntraVENous 2 times per day      Infusions:    dextrose      sodium chloride Stopped (02/02/24 0339)     PRN Meds: glucose, 4 tablet, PRN  dextrose bolus, 125 mL, PRN   Or  dextrose bolus, 250 mL, PRN  glucagon (rDNA), 1 mg, PRN  dextrose, , Continuous PRN  traMADol, 50 mg, Q6H PRN  ondansetron, 4 mg, Q6H PRN  sodium chloride, 100 mL, PRN  sodium chloride flush, 5-40 mL, PRN  sodium chloride, , PRN  polyethylene glycol, 17 g, Daily PRN  acetaminophen, 650 mg, Q6H PRN   Or  acetaminophen, 650 mg, Q6H PRN        Labs and Imaging   CT CHEST WO CONTRAST    Result Date: 2/1/2024  CT chest without contrast HISTORY: Sepsis COMPARISON: none CONTRAST: none TECHNIQUE: Individualized dose optimization technique was used in order to meet ALARA standards for radiation dose reduction. In addition to gender specific dose reduction algorithms, the dose reduction techniques vary based on the specific scanner utilized but frequently include automated exposure control, adjustment of the mA and/or kV according to patient size, and use of iterative reconstruction technique. COMMENTS: Degenerative changes in the spine with scoliosis. Age-indeterminate compression fracture of T12. Changes of prior CABG. Venous stents extending from the right brachial vein to 
g, Daily PRN  acetaminophen, 650 mg, Q6H PRN   Or  acetaminophen, 650 mg, Q6H PRN        Labs and Imaging   CT CHEST WO CONTRAST    Result Date: 2/1/2024  CT chest without contrast HISTORY: Sepsis COMPARISON: none CONTRAST: none TECHNIQUE: Individualized dose optimization technique was used in order to meet ALARA standards for radiation dose reduction. In addition to gender specific dose reduction algorithms, the dose reduction techniques vary based on the specific scanner utilized but frequently include automated exposure control, adjustment of the mA and/or kV according to patient size, and use of iterative reconstruction technique. COMMENTS: Degenerative changes in the spine with scoliosis. Age-indeterminate compression fracture of T12. Changes of prior CABG. Venous stents extending from the right brachial vein to the superior vena cava. Atrophic native kidneys. Upper abdomen is otherwise unremarkable. Extensive diffuse arterial calcification. Mild cardiomegaly. Mediastinal structures are otherwise unremarkable within the limitations of a noncontrast examination. Small bilateral pleural effusions. Bilateral lower lobe consolidation-atelectasis versus pneumonia.     Examination limited by lack of contrast. Bibasilar pulmonary consolidation. Small bilateral pleural effusions. Age-indeterminate compression fracture of T12. Electronically signed by Yobani Storm MD    XR CHEST PORTABLE    Result Date: 1/31/2024  History: Fever of unknown source. AP chest. COMPARISON: 8/14/2023. FINDINGS: Blunting of the costophrenic angles compatible with small effusions. Cardiomegaly. Bilateral perihilar and lower lung airspace opacity suggesting pulmonary edema. Bilateral pneumonia is not excluded. No acute osseous abnormality.     1. Small effusions with bilateral airspace opacity favored represent mild pulmonary edema. Superimposed pneumonia is not excluded. Electronically signed by Eddie Pierre MD      CBC:   Recent Labs 
some  Sequencing: Requires cues for some     Objective   Pulse: 70  Heart Rate Source: Monitor  BP: (!) 132/47  BP Location: Left upper arm  BP Method: Automatic  Patient Position: Semi fowlers  MAP (Calculated): 75  Respirations: 17  SpO2: 99 %  O2 Device: Nasal cannula  Temp: 98.5 °F (36.9 °C)                 Strength RLE  Comment: gross weakness- B BKAs  Strength LLE  Comment: gross weakness- B BKAs           Bed mobility  Rolling to Left: Maximum assistance  Rolling to Right: Maximum assistance  Supine to Sit: Maximum assistance  Sit to Supine: Maximum assistance  Scooting: Maximal assistance  Transfers  Comment: not attempted- uses jaqueline lift at baseline to electric wc        Balance  Sitting - Static: Fair  Sitting - Dynamic: Fair;- (CGA - min A with post and L lean while eating EOB x10min)           OutComes Score                                                  AM-PAC - Mobility    AM-PAC Basic Mobility - Inpatient   How much help is needed turning from your back to your side while in a flat bed without using bedrails?: A Lot  How much help is needed moving from lying on your back to sitting on the side of a flat bed without using bedrails?: A Lot  How much help is needed moving to and from a bed to a chair?: Total  How much help is needed standing up from a chair using your arms?: Total  How much help is needed walking in hospital room?: Total  How much help is needed climbing 3-5 steps with a railing?: Total  AM-PAC Inpatient Mobility Raw Score : 8  AM-PAC Inpatient T-Scale Score : 28.52  Mobility Inpatient CMS 0-100% Score: 86.62  Mobility Inpatient CMS G-Code Modifier : CM         Tinneti Score       Goals  Short Term Goals  Time Frame for Short Term Goals: no acute PT goals to be addressed       Education  Patient Education  Education Given To: Patient  Education Provided: Role of Therapy;Plan of Care  Education Method: Demonstration;Verbal  Barriers to Learning: None  Education Outcome: Verbalized 
opacity suggesting pulmonary edema. Bilateral pneumonia is not excluded. No acute osseous abnormality.     1. Small effusions with bilateral airspace opacity favored represent mild pulmonary edema. Superimposed pneumonia is not excluded. Electronically signed by Eddie Pierre MD      CBC:   Recent Labs     02/04/24  0548 02/05/24  0820 02/06/24  0622   WBC 22.7* 24.6* 22.2*   HGB 8.6* 8.0* 8.5*    244 270     BMP:    Recent Labs     02/04/24  0548 02/05/24  0820 02/06/24  0622    135* 137   K 4.2 4.7 4.1   CL 98* 97* 98*   CO2 25 23 23   BUN 38* 50* 21*   CREATININE 4.4* 5.0* 2.7*   GLUCOSE 203* 265* 157*     Hepatic:   No results for input(s): \"AST\", \"ALT\", \"ALB\", \"BILITOT\", \"ALKPHOS\" in the last 72 hours.    Lipids: No results found for: \"CHOL\", \"HDL\", \"TRIG\"  Hemoglobin A1C: No results found for: \"LABA1C\"  TSH: No results found for: \"TSH\"  Troponin: No results found for: \"TROPONINT\"  Lactic Acid: No results for input(s): \"LACTA\" in the last 72 hours.  BNP: No results for input(s): \"PROBNP\" in the last 72 hours.  UA:No results found for: \"NITRU\", \"COLORU\", \"PHUR\", \"LABCAST\", \"WBCUA\", \"RBCUA\", \"MUCUS\", \"TRICHOMONAS\", \"YEAST\", \"BACTERIA\", \"CLARITYU\", \"SPECGRAV\", \"LEUKOCYTESUR\", \"UROBILINOGEN\", \"BILIRUBINUR\", \"BLOODU\", \"GLUCOSEU\", \"KETUA\", \"AMORPHOUS\"  Urine Cultures: No results found for: \"LABURIN\"  Blood Cultures:   Lab Results   Component Value Date/Time    BC No Growth after 4 days of incubation. 02/01/2024 09:27 AM     Lab Results   Component Value Date/Time    BLOODCULT2 No Growth after 4 days of incubation. 01/31/2024 09:23 PM     Organism: No results found for: \"ORG\"      Electronically signed by Philip Oquendo MD on 2/6/2024 at 1:18 PM  Please note: All documentation, including physical exam and assessment/plan, from prior was copied and carried forward from encounter on 2/3/2024.  New findings and changes have been reviewed and updated appropriately to reflect today's encounter.

## 2024-02-06 NOTE — PLAN OF CARE
Point of Care Note:  General Surgery  Avelina Ponce    4:38 PM  2/6/2024    RN notified surgery resident regarding lab throwing away wound culture because it was not dated or initialed. Rather than notifying surgery to include the date and initial, lab threw away the wound culture and said it was illegal to do that, and demanded a new wound culture to be obtained.      Will obtain wound culture tomorrow during AM dressing change to include date/time and initials      Michelle Lopez DO, MSMEd  PGY-2, General Surgery  02/06/24  4:41 PM  [place]

## 2024-02-06 NOTE — CONSULTS
[facility] Nurse  Consult Note       NAME:  Avelina Ponce  MEDICAL RECORD NUMBER:  [id number]  AGE: 78 y.o.   GENDER: female  : 1945  TODAY'S DATE:  2024    Subjective   Reason for WOCN Evaluation and Assessment: Sacrum - unstageable      Avelina Ponce is a 78 y.o. female referred by:   [] Physician  [x] Nursing  [] Other:     Wound Identification:  Wound Type: pressure  Contributing Factors: chronic pressure, decreased mobility, shear force, anticoagulation therapy, and ESRD    Wound History:   78 y.o. female who presented to [facility] with sepsis.  PMHx significant for ESRD on HD CHF DM2 s/p bilateral LE amputations, PE on anticoagulation, CVA, HTN CAD.   Presents from facility for fever, 102F.  Per patient she felt hot.  Denies chills, cough/congestion, dysuria, abd pain, chest pain/pressure, nausea/vomiting. Missed dialysis because she was not herself, \"not feeling well.\"    Current Wound Care Treatment:  Sacrum - wet-dry    Patient Goal of Care:  [x] Wound Healing  [] Odor Control  [] Palliative Care  [] Pain Control   [] Other:         PAST MEDICAL HISTORY        Diagnosis Date    Systolic CHF (HCC)        PAST SURGICAL HISTORY    No past surgical history on file.    FAMILY HISTORY    No family history on file.    SOCIAL HISTORY    Social History     Tobacco Use    Smoking status: Never     Passive exposure: Never    Smokeless tobacco: Never   Vaping Use    Vaping Use: Never used       ALLERGIES    No Known Allergies    MEDICATIONS    No current facility-administered medications on file prior to encounter.     Current Outpatient Medications on File Prior to Encounter   Medication Sig Dispense Refill    carvedilol (COREG) 25 MG tablet Take 1 tablet by mouth 2 times daily      carvedilol (COREG) 12.5 MG tablet Take 1 tablet by mouth 2 times daily Take with 25 mg to total 37.5 mg, check bp before giving, if sbp <100 mmHg and / hr <60 hold coreg      Cholecalciferol 1.25 MG 
Clinical Pharmacy Consult Note    Warfarin - Management by Pharmacy    Consult Date(s): 2/1/24  Consulting Provider(s): Dr. Bebo Jordan    Assessment / Plan  History of DVT / PE - Warfarin  Goal INR: 2 - 3  Concurrent Anticoagulants / Antiplatelets: None at this time  Interactions: None at this time (warfarin held)  Current Regimen / Plan:   INR today of 5.12 (supratherapeutic)  Will hold warfarin at this time  PTD warfarin placeholder added to MAR   PT/INR ordered daily per protocol  Will monitor pt's clinical status and INR daily, and make dose adjustments as needed    Thank you for consulting pharmacy,    Julissa Dougherty, PharmD, BCCCP  Clinical Pharmacy Specialist - Emergency Dept  Wireless: [phone]  2/1/2024 1:54 PM      Subjective/Objective: Avelina Ponce is a 78 y.o. female with a PMHx significant for ESRD on HD (Mon/Wed/Fri), T2DM, HTN, HLD, s/p BKA, TIA, h/o DVT/PE (on warfarin) and PVD who is admitted with sepsis from her nursing facility.     Pharmacy is consulted to dose warfarin.    Ht Readings from Last 1 Encounters:   08/17/23 1.321 m (4' 4\")     Wt Readings from Last 1 Encounters:   02/01/24 45.5 kg (100 lb 3.2 oz)     Prior / Home Warfarin Regimen:  Pt admitted from nursing facility  Prior to admission warfarin dosing of 4.5 mg PO nightly per nursing facility documentation    Date INR Warfarin   2/1 5.12 HOLD   2/2                   Recent Labs     01/31/24  1942 02/01/24  0927   INR  --  5.12*   HGB 9.5* 8.9*    226   LABALBU 3.2* 3.3*   CREATININE 4.7* 5.1*       
Clinical Pharmacy Progress Note    Vancomycin has been discontinued. Will sign off pharmacy to dose Vancomycin consult.  If medication is restarted and pharmacy is to manage dosing, please re-consult at that time.    Please call with questions--  Thanks--  Cristina Bailey, OmayraD, BCPS, BCGP  [id number] ([facility])   2/2/2024 8:02 AM      
Consult received.  Labs and notes were reviewed.  Case was discussed with the staff.  Dialysis will be arranged     Full note to follow.    Thanks  Nephrology  [address] [place] [address] [id number]  [place] [address]  Office: [phone]  Cell: [phone]  Fax: [phone]    
General Surgery   Resident Consult Note    Reason for Consult: Sacral wound, unstagable    History of Present Illness:   Avelina Ponce is a 78 y.o. female with history of CAD s/p CABGx4, ESRD on HD, PAD s/p BL BKA, and prior pulmonary embolism on Warfarin who initially presented to [facility] ED on 1/31/24 with altered mental status, being treated for pneumonia.  Our service has been consulted regarding surgical intervention for her sacral wound.  Patient is currently unable to offer much history at this point due to her ongoing confused mental status.  She does endorse lower back pain but is unable to elaborate much further.  She states she usually walks at baseline.  She is unable to provide further history at this time.    Past Medical History:        Diagnosis Date    Coronary artery disease     Diabetes mellitus (HCC)     End stage renal disease on dialysis (HCC)     History of pulmonary embolism 2018    Hypertension     Peripheral arterial disease (HCC)     Systolic CHF (HCC)        Past Surgical History:        Procedure Laterality Date    AV FISTULA CREATION Right 08/24/2015    Right brachiocephalic arteriovenous fistula, Heather Mason MD    CORONARY ARTERY BYPASS GRAFT  10/31/2008    CABG x4, Michael Navarrete MD    LEG AMPUTATION BELOW KNEE Left 03/12/2015    LEFT BELOW KNEE AMPUTATION, Blake Bryan MD    LEG AMPUTATION BELOW KNEE Right 2003       Allergies:  Patient has no known allergies.    Medications:   Home Meds  No current facility-administered medications on file prior to encounter.     Current Outpatient Medications on File Prior to Encounter   Medication Sig Dispense Refill    carvedilol (COREG) 25 MG tablet Take 1 tablet by mouth 2 times daily      carvedilol (COREG) 12.5 MG tablet Take 1 tablet by mouth 2 times daily Take with 25 mg to total 37.5 mg, check bp before giving, if sbp <100 mmHg and / hr <60 hold coreg      Cholecalciferol 1.25 MG (23956 UT) TABS Take 1 tablet by mouth Once a week at 5 PM      
Infectious Diseases   Consult Note      Reason for Consult: Sacral decubitus ulceration  Requesting Physician: Dr. Oquendo  Date of Admission: 1/31/2024  Subjective:   CHIEF COMPLAINT: Fever    HPI:  78-year-old AA female with history of ESRD on HD, CHF, type 2 diabetes, CVA, PE, HTN, CAD with bilateral BKA that presented from her nursing facility on 1/31 with fever up to 102.  She also missed her dialysis due to feeling unwell.  In the ER, she was noted to be febrile with WBC of 16.2.  Chest x-ray showed small effusions and bilateral airspace opacities.  CT chest performed on 2/1 shows bibasilar pulmonary consolidation and small bilateral pleural effusions.  Surgery was consulted to evaluate the large sacral decubitus ulcer.  She was initially confused but later endorsed low back pain.  The patient was noted to have an unstageable sacral wound that was bleeding with gentle manipulation.  General surgery was consulted and has debrided this area.  She is currently on piperacillin/tazobactam.  Was previously on cefepime but this was stopped due to concerns of encephalopathy.  Leukocytosis remains 22 K and she has low-grade temps in the 99's - 100s.  She is planned for repeat debridement of the sacral decubitus today.  Palliative care has been involved and the patient is limited code                     Current abx: Pip-tazo         Past Surgical History:       Diagnosis Date    Coronary artery disease     Diabetes mellitus (HCC)     End stage renal disease on dialysis (HCC)     History of pulmonary embolism 2018    Hypertension     Peripheral arterial disease (HCC)     Systolic CHF (HCC)          Procedure Laterality Date    AV FISTULA CREATION Right 08/24/2015    Right brachiocephalic arteriovenous fistula, Heather Mason MD    CORONARY ARTERY BYPASS GRAFT  10/31/2008    CABG x4, Michael Navarrete MD    LEG AMPUTATION BELOW KNEE Left 03/12/2015    LEFT BELOW KNEE AMPUTATION, Blake Bryan MD    LEG AMPUTATION BELOW KNEE 
BID  insulin lispro (HUMALOG) injection vial 0-4 Units, 0-4 Units, SubCUTAneous, TID WC  insulin lispro (HUMALOG) injection vial 0-4 Units, 0-4 Units, SubCUTAneous, Nightly  warfarin placeholder: dosing by pharmacy, , Other, RX Placeholder  sodium chloride flush 0.9 % injection 5-40 mL, 5-40 mL, IntraVENous, 2 times per day  sodium chloride flush 0.9 % injection 5-40 mL, 5-40 mL, IntraVENous, PRN  0.9 % sodium chloride infusion, , IntraVENous, PRN  polyethylene glycol (GLYCOLAX) packet 17 g, 17 g, Oral, Daily PRN  acetaminophen (TYLENOL) tablet 650 mg, 650 mg, Oral, Q6H PRN **OR** acetaminophen (TYLENOL) suppository 650 mg, 650 mg, Rectal, Q6H PRN  vancomycin (VANCOCIN) intermittent dosing (placeholder), , Other, RX Placeholder  [COMPLETED] cefepime (MAXIPIME) 1,000 mg in sodium chloride 0.9 % 50 mL IVPB (mini-bag), 1,000 mg, IntraVENous, Once **FOLLOWED BY** cefepime (MAXIPIME) 1,000 mg in sodium chloride 0.9 % 50 mL IVPB (mini-bag), 1,000 mg, IntraVENous, Q24H    Vitals :     Vitals:    02/01/24 0900   BP: (!) 132/47   Pulse: 70   Resp:    Temp:    SpO2:           Physical Examination :     appearance: Alert, orientated to person and time  Respiratory: no distress, on 2L O2 NC  Cardiovascular: no visibly raised JVD, no Edema   Abdomen: -  soft  Other relevant findings: bilateral LE amputation, functional fistula in R arm     Labs :     CBC:   Recent Labs     01/31/24 1942   WBC 16.2*   HGB 9.5*   HCT 28.6*        BMP:    Recent Labs     01/31/24 1942   *   K 4.2   CL 97*   CO2 22   BUN 61*   CREATININE 4.7*   GLUCOSE 189*     No results found for: \"VOL\", \"APPEARANCE\", \"COLORU\", \"LABSPEC\", \"LABPH\", \"LEUKBLD\", \"NITRU\", \"GLUCOSEU\", \"KETUA\", \"UROBILINOGEN\", \"BILIRUBINUR\", \"OCBU\"     ----------------------------------------------------------    This pt has been staffed and discussed with Dr. Chilango Rollins MD, PGY-1  Internal Medicine    Please call with questions at      24 Hrs 
alcohol use.  Patient currently lives in a nursing home    Review of Systems -   Review of Systems: Unable to obtain due to mental status    Objective:        Physical Exam  Constitutional:       General: She is not in acute distress.  HENT:      Head: Normocephalic and atraumatic.   Cardiovascular:      Rate and Rhythm: Normal rate and regular rhythm.   Pulmonary:      Effort: Pulmonary effort is normal.   Abdominal:      Palpations: Abdomen is soft.      Tenderness: There is no abdominal tenderness.   Musculoskeletal:      Comments: Bilateral BKAs   Skin:     General: Skin is warm and dry.   Neurological:      Comments: Did not answer questions or follow commands          Palliative Performance Scale:  [] 60% Ambulation reduced; Significant disease; Can't do hobbies/housework; intake normal or reduced; occasional assist; LOC full/confusion  [] 50% Mainly sit/lie; Extensive disease; Can't do any work; Considerable assist; intake normal  Or reduced; LOC full/confusion  [] 40% Mainly in bed; Extensive disease; Mainly assist; intake normal or reduced; occasional assist; LOC full/confusion  [] 30% Bed Bound; Extensive disease; Total care; intake reduced; LOC full/confusion  [x] 20% Bed Bound; Extensive disease; Total care; intake minimal; Drowsy/coma  [] 10% Bed Bound; Extensive disease; Total care; Mouth care only; Drowsy/coma  [] 0% Death      Vitals:    BP (!) 102/45   Pulse 63   Temp 98 °F (36.7 °C)   Resp 16   Ht 1.321 m (4' 4.01\")   Wt 40.9 kg (90 lb 2.7 oz)   SpO2 99%   BMI 23.44 kg/m²     Labs:    BMP:   Recent Labs     02/03/24  0513 02/04/24  0548 02/05/24  0820   * 138 135*   K 4.1 4.2 4.7   CL 95* 98* 97*   CO2 26 25 23   BUN 27* 38* 50*   CREATININE 3.0* 4.4* 5.0*   GLUCOSE 108* 203* 265*   PHOS 2.0* 3.3 4.0     CBC:   Recent Labs     02/03/24  0513 02/04/24  0548 02/05/24  0820   WBC 22.1* 22.7* 24.6*   HGB 9.0* 8.6* 8.0*   HCT 28.0* 26.7* 24.6*    244 244       LFT's:   Recent Labs

## 2024-02-06 NOTE — PLAN OF CARE
Problem: Safety - Adult  Goal: Free from fall injury  Outcome: Progressing  Flowsheets (Taken 2/6/2024 0758)  Free From Fall Injury:   Instruct family/caregiver on patient safety   Based on caregiver fall risk screen, instruct family/caregiver to ask for assistance with transferring infant if caregiver noted to have fall risk factors     Problem: Pain  Goal: Verbalizes/displays adequate comfort level or baseline comfort level  Outcome: Progressing  Flowsheets (Taken 2/6/2024 0758)  Verbalizes/displays adequate comfort level or baseline comfort level:   Encourage patient to monitor pain and request assistance   Implement non-pharmacological measures as appropriate and evaluate response   Assess pain using appropriate pain scale   Consider cultural and social influences on pain and pain management   Administer analgesics based on type and severity of pain and evaluate response   Notify Licensed Independent Practitioner if interventions unsuccessful or patient reports new pain

## 2024-02-06 NOTE — PROCEDURES
Excisional Debridement of Decubitus Ulcer    Indication for Procedure:  Patient with a stage 4 decubitus ulcer 12-cm in length and 10-cm in width, requiring mechanical debridement of non-viable tissue to permit healing/recovery.     Pre-Procedure:   Written informed consent was provided by daughter after discussion of risks and benefits of the procedure, including the answering of all questions to the patient's stated satisfaction.     The image below was taken immediately prior to debridement:      Procedure:  Patient was identified by stated name, hospital-provided identification number, and wrist-band confirmation. Time-out was called immediately prior to positioning and all present were in agreement. Patient was then rolled into the right lateral decubitus position for ideal exposure. The skin was prepped and draped in the usual sterile manner, 1% lidocaine with epinephrine was used to anesthetize the wound edges for increased pain control. An #11 blade scalpel was used to excise the non-viable tissue overlying the wound. After the excision of eschar/non-viable tissue, wound was found to be stage 4. Additional fibrotic tissue was excised down to and including presacral fascia until the wound bed was composed of viable tissue, evidenced by the presence of bleeding. Wound was 16-cm in length, 11-cm in width and 1.5-cm in depth after debridement. Wound culture from sacrum obtained per ID's request.    Pressure was applied to the area to ensure that blood loss was kept to a minimum. Kerlix gauze was then moistened with Dakins and packed into the wound bed, followed by covering of the area with a sacral heart dressing.     Patient tolerated the procedure well.       Post-Procedure:  Patient to be kept in a flat supine position for 2 hours status post debridement to provide additional pressure for hemostasis. Please contact the surgery team should the patient have bleeding that does not stop soon after discovery.

## 2024-02-06 NOTE — CARE COORDINATION
DISCHARGE PLANNING:  Chart reviewed.  Patient is from [facility] as a long term care resident. No pre-cert needed to return. Facility helps transport patient to OP HD.    This CM received a call from Dr. Oquendo that family is interested in hospice services.  I called facility who stated they use [facility].  Referral was placed ([phone]).  They will be having a meeting with daughter/ALEK Santacruz today at 4pm.   Bedside RN was made aware.    CM team will continue to follow.  Daysi Ji, ALICIA Case Manager  [phone]

## 2024-02-06 NOTE — DISCHARGE SUMMARY
\"LACTA\" in the last 72 hours.  BNP: No results for input(s): \"PROBNP\" in the last 72 hours.  UA:No results found for: \"NITRU\", \"COLORU\", \"PHUR\", \"LABCAST\", \"WBCUA\", \"RBCUA\", \"MUCUS\", \"TRICHOMONAS\", \"YEAST\", \"BACTERIA\", \"CLARITYU\", \"SPECGRAV\", \"LEUKOCYTESUR\", \"UROBILINOGEN\", \"BILIRUBINUR\", \"BLOODU\", \"GLUCOSEU\", \"KETUA\", \"AMORPHOUS\"  Urine Cultures: No results found for: \"LABURIN\"  Blood Cultures:   Lab Results   Component Value Date/Time    BC No Growth after 4 days of incubation. 02/01/2024 09:27 AM     Lab Results   Component Value Date/Time    BLOODCULT2 No Growth after 4 days of incubation. 01/31/2024 09:23 PM     Organism: No results found for: \"ORG\"    Time Spent Discharging patient 34 minutes    Electronically signed by Philip Oquendo MD on 2/6/2024 at 5:13 PM

## 2024-02-07 LAB — MRSA DNA SPEC QL NAA+PROBE: NORMAL

## 2024-02-07 PROCEDURE — 6370000000 HC RX 637 (ALT 250 FOR IP): Performed by: INTERNAL MEDICINE

## 2024-02-07 PROCEDURE — 1250000000 HC SEMI PRIVATE HOSPICE R&B

## 2024-02-07 PROCEDURE — 2580000003 HC RX 258: Performed by: INTERNAL MEDICINE

## 2024-02-07 PROCEDURE — 6360000002 HC RX W HCPCS: Performed by: INTERNAL MEDICINE

## 2024-02-07 RX ORDER — OXYCODONE HCL 20 MG/ML
10 CONCENTRATE, ORAL ORAL
Status: DISCONTINUED | OUTPATIENT
Start: 2024-02-07 | End: 2024-02-07

## 2024-02-07 RX ORDER — LORAZEPAM 2 MG/ML
1 CONCENTRATE ORAL
Qty: 30 ML | Refills: 0 | Status: SHIPPED | OUTPATIENT
Start: 2024-02-07 | End: 2024-02-12

## 2024-02-07 RX ORDER — MORPHINE SULFATE 20 MG/ML
5 SOLUTION ORAL
Qty: 15 ML | Refills: 0 | Status: SHIPPED | OUTPATIENT
Start: 2024-02-07 | End: 2024-02-12

## 2024-02-07 RX ORDER — SODIUM HYPOCHLORITE 1.25 MG/ML
SOLUTION TOPICAL DAILY
Refills: 0 | DISCHARGE
Start: 2024-02-08

## 2024-02-07 RX ADMIN — HYDROMORPHONE HYDROCHLORIDE 0.5 MG: 1 INJECTION, SOLUTION INTRAMUSCULAR; INTRAVENOUS; SUBCUTANEOUS at 14:46

## 2024-02-07 RX ADMIN — HYDROMORPHONE HYDROCHLORIDE 0.25 MG: 1 INJECTION, SOLUTION INTRAMUSCULAR; INTRAVENOUS; SUBCUTANEOUS at 04:09

## 2024-02-07 RX ADMIN — DEXTRAN 70, GLYCERIN, HYPROMELLOSE 1 DROP: 1; 2; 3 SOLUTION/ DROPS OPHTHALMIC at 23:05

## 2024-02-07 RX ADMIN — DEXTRAN 70, GLYCERIN, HYPROMELLOSE 1 DROP: 1; 2; 3 SOLUTION/ DROPS OPHTHALMIC at 00:02

## 2024-02-07 RX ADMIN — SODIUM CHLORIDE, PRESERVATIVE FREE 10 ML: 5 INJECTION INTRAVENOUS at 23:05

## 2024-02-07 RX ADMIN — SODIUM CHLORIDE, PRESERVATIVE FREE 10 ML: 5 INJECTION INTRAVENOUS at 09:42

## 2024-02-07 ASSESSMENT — PAIN SCALES - GENERAL
PAINLEVEL_OUTOF10: 0
PAINLEVEL_OUTOF10: 8

## 2024-02-07 NOTE — DISCHARGE SUMMARY
V2.0  Discharge Summary    Name:  Avelina Ponce /Age/Sex: 1945 (78 y.o. female)   Admit Date: 2024  Discharge Date: 24   MRN & CSN:  [id number] & [id number] Encounter Date and Time 24 12:49 PM   Attending:  Philip Oquendo MD Discharging Provider: Philip Oquendo MD       Hospital Course:     Patient admitted for hospice care and inpatient hospital bed  Monitor x 24 hours  Stable for discharge to long-term care with hospice today  Discussed with case management  Discussed with RN  Family agreeable with disposition  Comfort medications in place, morphine and Ativan prescriptions printed and placed in the chart      The patient expressed appropriate understanding of, and agreement with the discharge recommendations, medications, and plan.     Consults this admission:  None    Discharge Diagnosis:   Hospice care    Discharge Instruction:   Follow up appointments: Not applicable  Diet as tolerated    Discharge Medications:        Medication List        START taking these medications      LORazepam 2 MG/ML concentrated solution  Commonly known as: ATIVAN  Take 0.5 mLs by mouth every 2 hours as needed for Anxiety for up to 5 days. Max Daily Amount: 12 mg     morphine 20MG/ML Soln concentrated solution  Take 0.25 mLs by mouth every 2 hours as needed (For patient comfort) for up to 5 days. Max Daily Amount: 60 mg     sodium hypochlorite 0.125 % Soln external solution  Commonly known as: DAKINS  Apply topically daily            CONTINUE taking these medications      Artificial Tears 0.2-0.2-1 % Soln opthalmic solution  Generic drug: glycerin-hypromellose-     Dexcom G7  Keira  Any Dexcom  works so long as it is compatible with the sensor     Dexcom G7 Sensor Misc  Any glucose sensor works     glucose 40 % Gel  Commonly known as: GLUTOSE     Glucose Management Tabs     pantoprazole 40 MG tablet  Commonly known as: PROTONIX     polyethylene glycol 17 GM/SCOOP powder  Commonly

## 2024-02-07 NOTE — PLAN OF CARE
Problem: Pain  Goal: Verbalizes/displays adequate comfort level or baseline comfort level  Outcome: Progressing  Flowsheets (Taken 2/7/2024 0822)  Verbalizes/displays adequate comfort level or baseline comfort level:   Encourage patient to monitor pain and request assistance   Implement non-pharmacological measures as appropriate and evaluate response   Assess pain using appropriate pain scale   Consider cultural and social influences on pain and pain management   Administer analgesics based on type and severity of pain and evaluate response   Notify Licensed Independent Practitioner if interventions unsuccessful or patient reports new pain

## 2024-02-07 NOTE — H&P
specific scanner utilized but frequently include automated exposure control, adjustment of the mA and/or kV according to patient size, and use of iterative reconstruction technique. COMMENTS: Degenerative changes in the spine with scoliosis. Age-indeterminate compression fracture of T12. Changes of prior CABG. Venous stents extending from the right brachial vein to the superior vena cava. Atrophic native kidneys. Upper abdomen is otherwise unremarkable. Extensive diffuse arterial calcification. Mild cardiomegaly. Mediastinal structures are otherwise unremarkable within the limitations of a noncontrast examination. Small bilateral pleural effusions. Bilateral lower lobe consolidation-atelectasis versus pneumonia.     Examination limited by lack of contrast. Bibasilar pulmonary consolidation. Small bilateral pleural effusions. Age-indeterminate compression fracture of T12. Electronically signed by Yobani Storm MD    XR CHEST PORTABLE    Result Date: 1/31/2024  History: Fever of unknown source. AP chest. COMPARISON: 8/14/2023. FINDINGS: Blunting of the costophrenic angles compatible with small effusions. Cardiomegaly. Bilateral perihilar and lower lung airspace opacity suggesting pulmonary edema. Bilateral pneumonia is not excluded. No acute osseous abnormality.     1. Small effusions with bilateral airspace opacity favored represent mild pulmonary edema. Superimposed pneumonia is not excluded. Electronically signed by Eddie Pierre MD        Electronically signed by Philip Oquendo MD on 2/6/2024 at 11:12 PM

## 2024-02-08 VITALS
RESPIRATION RATE: 16 BRPM | WEIGHT: 92.59 LBS | HEIGHT: 55 IN | BODY MASS INDEX: 21.43 KG/M2 | HEART RATE: 82 BPM | TEMPERATURE: 98.3 F

## 2024-02-08 LAB
BLOOD BANK DISPENSE STATUS: NORMAL
BLOOD BANK DISPENSE STATUS: NORMAL
BLOOD BANK PRODUCT CODE: NORMAL
BLOOD BANK PRODUCT CODE: NORMAL
BPU ID: NORMAL
BPU ID: NORMAL
DESCRIPTION BLOOD BANK: NORMAL
DESCRIPTION BLOOD BANK: NORMAL

## 2024-02-08 PROCEDURE — 6370000000 HC RX 637 (ALT 250 FOR IP): Performed by: INTERNAL MEDICINE

## 2024-02-08 PROCEDURE — 2580000003 HC RX 258: Performed by: INTERNAL MEDICINE

## 2024-02-08 PROCEDURE — 6360000002 HC RX W HCPCS: Performed by: INTERNAL MEDICINE

## 2024-02-08 RX ADMIN — SODIUM CHLORIDE, PRESERVATIVE FREE 10 ML: 5 INJECTION INTRAVENOUS at 08:11

## 2024-02-08 RX ADMIN — HYDROMORPHONE HYDROCHLORIDE 0.5 MG: 1 INJECTION, SOLUTION INTRAMUSCULAR; INTRAVENOUS; SUBCUTANEOUS at 05:27

## 2024-02-08 RX ADMIN — DAKIN'S SOLUTION 0.125% (QUARTER STRENGTH): 0.12 SOLUTION at 16:23

## 2024-02-08 RX ADMIN — DEXTRAN 70, GLYCERIN, HYPROMELLOSE 1 DROP: 1; 2; 3 SOLUTION/ DROPS OPHTHALMIC at 08:10

## 2024-02-08 RX ADMIN — HYDROMORPHONE HYDROCHLORIDE 0.5 MG: 1 INJECTION, SOLUTION INTRAMUSCULAR; INTRAVENOUS; SUBCUTANEOUS at 13:45

## 2024-02-08 ASSESSMENT — PAIN DESCRIPTION - ORIENTATION
ORIENTATION: OTHER (COMMENT)
ORIENTATION: OTHER (COMMENT)

## 2024-02-08 ASSESSMENT — PAIN DESCRIPTION - DESCRIPTORS
DESCRIPTORS: PATIENT UNABLE TO DESCRIBE
DESCRIPTORS: PATIENT UNABLE TO DESCRIBE

## 2024-02-08 ASSESSMENT — PAIN DESCRIPTION - PAIN TYPE
TYPE: CHRONIC PAIN
TYPE: CHRONIC PAIN

## 2024-02-08 ASSESSMENT — PAIN DESCRIPTION - ONSET
ONSET: UNABLE TO COMMUNICATE
ONSET: UNABLE TO COMMUNICATE

## 2024-02-08 ASSESSMENT — PAIN DESCRIPTION - FREQUENCY
FREQUENCY: CONTINUOUS
FREQUENCY: CONTINUOUS

## 2024-02-08 ASSESSMENT — PAIN SCALES - WONG BAKER
WONGBAKER_NUMERICALRESPONSE: 0
WONGBAKER_NUMERICALRESPONSE: 0
WONGBAKER_NUMERICALRESPONSE: 2
WONGBAKER_NUMERICALRESPONSE: 0

## 2024-02-08 ASSESSMENT — PAIN - FUNCTIONAL ASSESSMENT
PAIN_FUNCTIONAL_ASSESSMENT: ACTIVITIES ARE NOT PREVENTED
PAIN_FUNCTIONAL_ASSESSMENT: ACTIVITIES ARE NOT PREVENTED

## 2024-02-08 ASSESSMENT — PAIN DESCRIPTION - LOCATION: LOCATION: GENERALIZED

## 2024-02-08 NOTE — DISCHARGE INSTR - COC
Continuity of Care Form    Patient Name: Avelina Ponce   :  1945  MRN:  [id number]    Admit date:  2024  Discharge date:  24    Code Status Order: DNR-CC   Advance Directives:     Admitting Physician:  Philip Oquendo MD  PCP: Nena Booker MD    Discharging Nurse: johnnie    Discharging Hospital Unit/Room#: 6327/6327-01  Discharging Unit Phone Number: ***    Emergency Contact:   Extended Emergency Contact Information  Primary Emergency Contact: Neeta Curtis  Work Phone: [phone]  Mobile Phone: [phone]  Relation: Child  Secondary Emergency Contact: Toya Verma  Home Phone: [phone]  Relation: Child    Past Surgical History:  Past Surgical History:   Procedure Laterality Date    AV FISTULA CREATION Right 2015    Right brachiocephalic arteriovenous fistula, Heather Mason MD    CORONARY ARTERY BYPASS GRAFT  10/31/2008    CABG x4, Michael Navarrete MD    LEG AMPUTATION BELOW KNEE Left 2015    LEFT BELOW KNEE AMPUTATION, Blake Bryan MD    LEG AMPUTATION BELOW KNEE Right        Immunization History:     There is no immunization history on file for this patient.    Active Problems:  Patient Active Problem List   Diagnosis Code    ESRD (end stage renal disease) ([facility]) N18.6    Sepsis ([facility]) A41.9    Pressure injury of sacral region, unstageable ([facility]) L89.150    Hospice care Z51.5       Isolation/Infection:   Isolation            No Isolation          Patient Infection Status       None to display                     Nurse Assessment:  Last Vital Signs: Pulse 82   Temp 98.3 °F (36.8 °C)   Resp 16   Ht 1.321 m (4' 4.01\")   Wt 42 kg (92 lb 9.5 oz)   BMI 24.07 kg/m²     Last documented pain score (0-10 scale): Pain Level: 8  Last Weight:   Wt Readings from Last 1 Encounters:   24 42 kg (92 lb 9.5 oz)     Mental Status:  disoriented and alert    IV Access:  - None    Nursing Mobility/ADLs:  Walking   Dependent  Transfer  Dependent  Bathing  Dependent  Dressing

## 2024-02-08 NOTE — CARE COORDINATION
DISCHARGE PLANNIN    This CM received a voicemail from Zaira at [facility] ([phone]) that patient will be admitted under their services as GIP.  I called her back this morning and she stated they are all set. Someone from their team will stop by to see her today.       UPDATE: 6  This CM called Zaria with [facility] to see if she had an update on patient potentially transferring to LTC with their services.   Zaria stated she was the admission nurse yesterday so she was not sure of the plan for today. She does know that a nurse from their company will be coming to bedside to assess if patient is stable enough for the transport.   If so, they will set up transportation.  Will update bedside RN.      CM team will continue to follow.  Daysi Ji RN Case Manager  [phone]  
DISCHARGE PLANNIN  This CM reached out to [facility] ([phone]) to see if a visit would occur today.  Office staff stated a visit will occur to evaluate if patient is stable enough to transfer to LTC facility.  Callback number was given for updates.      UPDATE: [id number]  This CM spoke with representative with [facility], Naina [phone]. She is going to call facility to inform them of return.   [facility] to set up transport and keep this CM updated on time    CM team will continue to follow.  Daysi Ji, RN Case Manager  [phone]  
[address]  [facility] [address]  Phone: [phone] Fax: [phone]      Assistance purchasing medications?:    Assistance provided by Case Management: None at this time    Does patient want to participate in local refill/ meds to beds program?: No    Meds To Beds General Rules:  1. Can ONLY be done Monday- Friday between 8:30am-5pm  2. Prescription(s) must be in pharmacy by 3pm to be filled same day  3.Copy of patient's insurance/ prescription drug card and patient face sheet must be sent along with the prescription(s)  4. Cost of Rx cannot be added to hospital bill. If financial assistance is needed, please contact unit  or ;  or  CANNOT provide pharmacy voucher for patients co-pays  5. Patients can then  the prescription on their way out of the hospital at discharge, or pharmacy can deliver to the bedside if staff is available. (payment due at time of pick-up or delivery - cash, check, or card accepted)     Able to afford home medications/ co-pay costs: Yes    ADLS:  Current PT AM-PAC Score:   /24  Current OT AM-PAC Score:   /24      DISCHARGE Disposition: Nursing Facility: [facility] with Hospice: [facility]    LOC at discharge: Long Term Care  DERRELL Completed: Not Indicated    Notification completed in HENS/PAS?:  Not Applicable    IMM Completed:   Not Indicated due to: commerical         Transportation:  Transportation PLAN for discharge: EMS transportation   Mode of Transport: Ambulance stretcher - BLS  Reason for medical transport: Bed confined: Meets the following criteria 1) unable to get out of bed without assistance or ambulate, 2) unable to safely sit up in a wheelchair, 3) unable to maintain erect seating position in a chair for time needed for transport  Name of Transport Company: [employer]  Phone: [phone]  Time of Transport: 1630    Transport form completed: Yes    Home Care:  Home Care ordered at

## 2024-02-08 NOTE — PROGRESS NOTES
Nurse from hospice care called and stated they will try to get out here later on tonight to see patient but she is unsure if anyone can get here by tonight. Signed prescriptions placed on chart.  
Vital signs deferred to promote Pt comfort care.  
  CREATININE 2.7*   GLUCOSE 157*     Hepatic: No results for input(s): \"AST\", \"ALT\", \"ALB\", \"BILITOT\", \"ALKPHOS\" in the last 72 hours.  Lipids: No results found for: \"CHOL\", \"HDL\", \"TRIG\"  Hemoglobin A1C: No results found for: \"LABA1C\"  TSH: No results found for: \"TSH\"  Troponin: No results found for: \"TROPONINT\"  Lactic Acid: No results for input(s): \"LACTA\" in the last 72 hours.  BNP: No results for input(s): \"PROBNP\" in the last 72 hours.  UA:No results found for: \"NITRU\", \"COLORU\", \"PHUR\", \"LABCAST\", \"WBCUA\", \"RBCUA\", \"MUCUS\", \"TRICHOMONAS\", \"YEAST\", \"BACTERIA\", \"CLARITYU\", \"SPECGRAV\", \"LEUKOCYTESUR\", \"UROBILINOGEN\", \"BILIRUBINUR\", \"BLOODU\", \"GLUCOSEU\", \"KETUA\", \"AMORPHOUS\"  Urine Cultures: No results found for: \"LABURIN\"  Blood Cultures:   Lab Results   Component Value Date/Time    BC No Growth after 4 days of incubation. 02/01/2024 09:27 AM     Lab Results   Component Value Date/Time    BLOODCULT2 No Growth after 4 days of incubation. 01/31/2024 09:23 PM     Organism: No results found for: \"ORG\"      Electronically signed by Philip Oquendo MD on 2/8/2024 at 12:47 PM